# Patient Record
Sex: MALE | Race: WHITE | NOT HISPANIC OR LATINO | Employment: FULL TIME | URBAN - NONMETROPOLITAN AREA
[De-identification: names, ages, dates, MRNs, and addresses within clinical notes are randomized per-mention and may not be internally consistent; named-entity substitution may affect disease eponyms.]

---

## 2018-03-01 ENCOUNTER — OFFICE VISIT (OUTPATIENT)
Dept: INTERNAL MEDICINE | Facility: CLINIC | Age: 48
End: 2018-03-01

## 2018-03-01 VITALS
RESPIRATION RATE: 12 BRPM | WEIGHT: 207.4 LBS | HEART RATE: 61 BPM | SYSTOLIC BLOOD PRESSURE: 124 MMHG | HEIGHT: 72 IN | DIASTOLIC BLOOD PRESSURE: 80 MMHG | OXYGEN SATURATION: 96 % | BODY MASS INDEX: 28.09 KG/M2

## 2018-03-01 DIAGNOSIS — K21.9 GASTROESOPHAGEAL REFLUX DISEASE, ESOPHAGITIS PRESENCE NOT SPECIFIED: ICD-10-CM

## 2018-03-01 DIAGNOSIS — Z99.89 OBSTRUCTIVE SLEEP APNEA ON CPAP: Primary | ICD-10-CM

## 2018-03-01 DIAGNOSIS — M54.6 CHRONIC BILATERAL THORACIC BACK PAIN: ICD-10-CM

## 2018-03-01 DIAGNOSIS — Z80.0 FAMILY HISTORY OF COLON CANCER IN MOTHER: ICD-10-CM

## 2018-03-01 DIAGNOSIS — G89.29 CHRONIC BILATERAL THORACIC BACK PAIN: ICD-10-CM

## 2018-03-01 DIAGNOSIS — G47.33 OBSTRUCTIVE SLEEP APNEA ON CPAP: Primary | ICD-10-CM

## 2018-03-01 PROBLEM — K63.5 COLON POLYPS: Status: ACTIVE | Noted: 2018-03-01

## 2018-03-01 PROCEDURE — 99203 OFFICE O/P NEW LOW 30 MIN: CPT | Performed by: FAMILY MEDICINE

## 2018-03-01 RX ORDER — IBUPROFEN 800 MG/1
800 TABLET ORAL DAILY
COMMUNITY
End: 2019-06-20

## 2018-03-01 NOTE — PROGRESS NOTES
New pt, est care with Dr. Hernandez.   C/O pain in chest, back (between shoulder blades) ,ribs while sleeping. Wakes him up in the middle of the night. Has been going on about 6 months. Was seen by former PCp for this issue and was prescribed a muscle relaxer. Didn't really help.     Subjective   Nicho Molina is a 47 y.o. male who presents to establish care with me.    Chief Complaint:  Joint/Muscle Pain  Patient complains of myalgias, which have/has been present for 6 months. Pain is located in mid upper back & axilla along ribs.  Started about 6 months ago when he started sleeping on his back with his sleep machine.  The pain is described as moderate, Sharp/stabbing, in back but having to take ibuprofen nightly.  Associated symptoms include: none, but has a rare intermittent tingling pain from left neck down into shoulder.  Related to injury: no but has had lots of physical activity.    Gerd: admits to some gerd, but doesn't take anything for it.  Hasn't ever had an upper endoscopy.  Doesn't have obvious reflux symptoms during the day but does have some acid type tase in his mouth in the mornings.       The following portions of the patient's history were reviewed and updated as appropriate: He  has a past medical history of Arthritis; Colon polyp; Diverticulosis; Gout; and Hyperlipidemia.  He has Mixed hyperlipidemia and Obstructive sleep apnea on CPAP on his pertinent problem list.  He  has a past surgical history that includes Colonoscopy (2015) and Cholesteatoma excision (Right, 1981, 1984, 1989).  His family history includes Alcohol abuse in his brother and father; COPD in his father and maternal aunt; Cancer in his mother; Colon cancer in his mother; Early death in his mother; Heart attack (age of onset: 63) in his father; Heart disease in his father and maternal uncle; Hyperlipidemia in his father and maternal uncle; Hypertension in his father and maternal uncle; No Known Problems in his maternal aunt,  maternal aunt, and sister.  He  reports that he has never smoked. His smokeless tobacco use includes Snuff. He reports that he drinks about 4.8 oz of alcohol per week  He reports that he does not use illicit drugs.  Current Outpatient Prescriptions   Medication Sig Dispense Refill   • ibuprofen (ADVIL,MOTRIN) 800 MG tablet Take 800 mg by mouth Daily.       No current facility-administered medications for this visit.    .    Review of Systems  Review of Systems   Constitutional: Positive for fatigue and unexpected weight change. Negative for activity change, appetite change, chills and fever.        No malaise   HENT: Positive for hearing loss. Negative for ear pain, nosebleeds, rhinorrhea, sore throat, trouble swallowing and voice change.    Eyes: Negative for pain, discharge, redness, itching and visual disturbance.        No dry eyes   Respiratory: Negative for cough, chest tightness, shortness of breath and wheezing.         No SOB with exertion  No SOB lying down   Cardiovascular: Negative for chest pain, palpitations and leg swelling.        No leg cramps   Gastrointestinal: Negative for abdominal pain, blood in stool, constipation, diarrhea, nausea and vomiting.        + frequent heartburn  No change in bowel habits   Endocrine: Negative for cold intolerance, heat intolerance, polydipsia, polyphagia and polyuria.        No Muscle weakness  No feeling of weakness  No Hot flashes   Genitourinary: Negative for decreased urine volume, difficulty urinating, discharge, dysuria, frequency, hematuria, penile pain, testicular pain and urgency.        No lump on testicles   Musculoskeletal: Negative for arthralgias, gait problem, joint swelling and myalgias.        No limb pain  No limb swelling   Skin: Negative for rash and wound.        No rash  No lesions  No Itching  No change in mole   Neurological: Negative for dizziness, tremors, seizures, syncope, weakness, numbness and headaches.        No trouble walking  No  "confusion  No tingling       Hematological: Negative for adenopathy. Does not bruise/bleed easily.   Psychiatric/Behavioral: Positive for sleep disturbance. Negative for dysphoric mood and suicidal ideas. The patient is not nervous/anxious.         No change in personality         Objective    /80  Pulse 61  Resp 12  Ht 182.9 cm (72\")  Wt 94.1 kg (207 lb 6.4 oz)  SpO2 96%  BMI 28.13 kg/m2  Physical Exam   Constitutional: He is oriented to person, place, and time. He appears well-developed and well-nourished. No distress.   HENT:   Head: Normocephalic and atraumatic.   Right Ear: Tympanic membrane, external ear and ear canal normal.   Left Ear: Tympanic membrane, external ear and ear canal normal.   Nose: No rhinorrhea.   Mouth/Throat: Uvula is midline, oropharynx is clear and moist and mucous membranes are normal. No posterior oropharyngeal erythema. No tonsillar exudate.   Eyes: Conjunctivae and lids are normal. Pupils are equal, round, and reactive to light. Right eye exhibits no discharge. Left eye exhibits no discharge. No scleral icterus.   Neck: Trachea normal, normal range of motion and phonation normal. Neck supple. No thyroid mass and no thyromegaly present.   Cardiovascular: Normal rate, regular rhythm and normal heart sounds.    No murmur heard.  Pulmonary/Chest: Effort normal and breath sounds normal.   Abdominal: Soft. Normal appearance. There is no splenomegaly or hepatomegaly. There is no tenderness. No hernia.   Musculoskeletal: Normal range of motion. He exhibits no edema, tenderness or deformity.   Lymphadenopathy:        Head (right side): No submental, no submandibular, no preauricular and no posterior auricular adenopathy present.        Head (left side): No submental, no submandibular, no preauricular and no posterior auricular adenopathy present.     He has no cervical adenopathy.        Right: No supraclavicular adenopathy present.        Left: No supraclavicular adenopathy " present.   Neurological: He is alert and oriented to person, place, and time. He has normal strength. A sensory deficit is present.   Reflex Scores:       Patellar reflexes are 2+ on the right side and 2+ on the left side.  + spurlings left UE   Skin: Skin is warm and dry. No rash noted. No pallor.   Psychiatric: He has a normal mood and affect. His behavior is normal. Judgment and thought content normal.         Assessment/Plan      Diagnosis Plan   1. Obstructive sleep apnea on CPAP  Ferritin   2. Chronic bilateral thoracic back pain  Comprehensive Metabolic Panel    CBC (No Diff)    Ferritin    Iron Profile    Vitamin B12    XR Spine Thoracic 3 View   3. Gastroesophageal reflux disease, esophagitis presence not specified  Comprehensive Metabolic Panel    CBC (No Diff)    Ferritin    Iron Profile    Vitamin B12    Helicobacter Pylori, IgA IgG IgM   4. Family history of colon cancer in mother  Comprehensive Metabolic Panel    CBC (No Diff)    Ferritin    Iron Profile    Vitamin B12         depedning on xray and lab results will try antacid versus PT for pain.

## 2018-03-02 ENCOUNTER — HOSPITAL ENCOUNTER (OUTPATIENT)
Dept: GENERAL RADIOLOGY | Facility: HOSPITAL | Age: 48
Discharge: HOME | End: 2018-03-02
Attending: FAMILY MEDICINE | Admitting: FAMILY MEDICINE

## 2018-03-02 DIAGNOSIS — G89.29 CHRONIC BILATERAL THORACIC BACK PAIN: ICD-10-CM

## 2018-03-02 DIAGNOSIS — M54.6 CHRONIC BILATERAL THORACIC BACK PAIN: ICD-10-CM

## 2018-03-02 PROCEDURE — 72072 X-RAY EXAM THORAC SPINE 3VWS: CPT

## 2019-06-20 ENCOUNTER — OFFICE VISIT (OUTPATIENT)
Dept: INTERNAL MEDICINE | Facility: CLINIC | Age: 49
End: 2019-06-20

## 2019-06-20 VITALS
DIASTOLIC BLOOD PRESSURE: 82 MMHG | HEART RATE: 72 BPM | WEIGHT: 208 LBS | TEMPERATURE: 98.5 F | HEIGHT: 72 IN | SYSTOLIC BLOOD PRESSURE: 124 MMHG | BODY MASS INDEX: 28.17 KG/M2 | OXYGEN SATURATION: 98 %

## 2019-06-20 DIAGNOSIS — Z72.0 SMOKELESS TOBACCO USE: ICD-10-CM

## 2019-06-20 DIAGNOSIS — Z80.0 FAMILY HISTORY OF COLON CANCER IN MOTHER: ICD-10-CM

## 2019-06-20 DIAGNOSIS — Z12.11 SCREEN FOR COLON CANCER: ICD-10-CM

## 2019-06-20 DIAGNOSIS — Z12.5 SCREENING PSA (PROSTATE SPECIFIC ANTIGEN): ICD-10-CM

## 2019-06-20 DIAGNOSIS — Z71.6 ENCOUNTER FOR SMOKING CESSATION COUNSELING: ICD-10-CM

## 2019-06-20 DIAGNOSIS — K63.5 POLYP OF COLON, UNSPECIFIED PART OF COLON, UNSPECIFIED TYPE: ICD-10-CM

## 2019-06-20 DIAGNOSIS — E78.2 MIXED HYPERLIPIDEMIA: ICD-10-CM

## 2019-06-20 DIAGNOSIS — Z00.00 PREVENTATIVE HEALTH CARE: ICD-10-CM

## 2019-06-20 DIAGNOSIS — Z00.00 ANNUAL PHYSICAL EXAM: Primary | ICD-10-CM

## 2019-06-20 LAB
ALBUMIN SERPL-MCNC: 4.4 G/DL (ref 3.5–5)
ALBUMIN/GLOB SERPL: 1.6 G/DL (ref 1–2)
ALP SERPL-CCNC: 76 U/L (ref 38–126)
ALT SERPL-CCNC: 52 U/L (ref 13–69)
AST SERPL-CCNC: 34 U/L (ref 15–46)
BILIRUB SERPL-MCNC: 0.6 MG/DL (ref 0.2–1.3)
BUN SERPL-MCNC: 15 MG/DL (ref 7–20)
BUN/CREAT SERPL: 16.7 (ref 6.3–21.9)
CALCIUM SERPL-MCNC: 9.2 MG/DL (ref 8.4–10.2)
CHLORIDE SERPL-SCNC: 101 MMOL/L (ref 98–107)
CHOLEST SERPL-MCNC: 252 MG/DL (ref 0–199)
CO2 SERPL-SCNC: 29 MMOL/L (ref 26–30)
CREAT SERPL-MCNC: 0.9 MG/DL (ref 0.6–1.3)
GLOBULIN SER CALC-MCNC: 2.7 GM/DL
GLUCOSE SERPL-MCNC: 93 MG/DL (ref 74–98)
HDLC SERPL-MCNC: 41 MG/DL (ref 40–60)
LDLC SERPL CALC-MCNC: 149 MG/DL (ref 0–99)
POTASSIUM SERPL-SCNC: 4.2 MMOL/L (ref 3.5–5.1)
PROT SERPL-MCNC: 7.1 G/DL (ref 6.3–8.2)
PSA SERPL-MCNC: 0.73 NG/ML (ref 0.06–4)
SODIUM SERPL-SCNC: 140 MMOL/L (ref 137–145)
TRIGL SERPL-MCNC: 311 MG/DL
VLDLC SERPL CALC-MCNC: 62.2 MG/DL

## 2019-06-20 PROCEDURE — 99396 PREV VISIT EST AGE 40-64: CPT | Performed by: PHYSICIAN ASSISTANT

## 2019-06-20 RX ORDER — VARENICLINE TARTRATE 1 MG/1
1 TABLET, FILM COATED ORAL 2 TIMES DAILY
Qty: 60 TABLET | Refills: 4 | Status: SHIPPED | OUTPATIENT
Start: 2019-06-20 | End: 2020-01-14

## 2019-06-20 NOTE — PROGRESS NOTES
Subjective   Nicho Molina is a 48 y.o. male and is here for a comprehensive physical exam. The patient reports no problems.    HPI: History of mixed HLD.  At age 33 he was diagnosed with hyperlipidemia. He was prescribed statins but failed Crestor and Lipitor due to myalgias so he has not been treated since in the last 2 years or so. He has since changed his diet but has gained weight due to lack of exercise.     Last colonoscopy was around 3 years ago. Has had polyps with most of the 6 previous colonoscopies. Mom  from colon cancer at age 42.  He denies any GI complaints including constipation, diarrhea, abdominal pain, nausea, vomiting, acid reflux, melena or bright red blood per rectum.    Has been using smokeless tobacco for many years and is ready to quit. He has tried patches and gum which helped him quit for 6 and 9 months each time in the past.            Do you take any herbs or supplements that were not prescribed by a doctor? no  Are you taking calcium supplements? No  Are you taking aspirin daily? No     History:  Patient receives prostate care here: Yes  Date last prostate exam: 3-4 years ago  Date last PSA: 3-4 years ago  He reports little decrease in urinary stream,  No nocturia, little dribbling, little morning hesitancy.    Family history of prostate cancer: unknown.     has no sexual activity history on file.    The following portions of the patient's history were reviewed and updated as appropriate: He  has a past medical history of Arthritis, Colon polyp, Diverticulosis, Gout, and Hyperlipidemia.  He does not have any pertinent problems on file.  He  has a past surgical history that includes Colonoscopy () and Cholesteatoma excision (Right, , , ).  His family history includes Alcohol abuse in his brother and father; COPD in his father and maternal aunt; Cancer in his mother; Colon cancer in his mother; Early death in his mother; Heart attack (age of onset: 63) in his  father; Heart disease in his father and maternal uncle; Hyperlipidemia in his father and maternal uncle; Hypertension in his father and maternal uncle; No Known Problems in his maternal aunt, maternal aunt, and sister.  He  reports that he has never smoked. His smokeless tobacco use includes snuff. He reports that he drinks about 4.8 oz of alcohol per week. He reports that he does not use drugs.  Current Outpatient Medications   Medication Sig Dispense Refill   • varenicline (CHANTIX CONTINUING MONTH GABRIELLA) 1 MG tablet Take 1 tablet by mouth 2 (Two) Times a Day. 60 tablet 4   • varenicline (CHANTIX STARTING MONTH GABRIELLA) 0.5 MG X 11 & 1 MG X 42 tablet Take 0.5 mg one daily on days 1-3 and and 0.5 mg twice daily on days 4-7.Then 1 mg twice daily for a total of 12 weeks. 53 tablet 0     No current facility-administered medications for this visit.        Review of Systems  Do you have pain that bothers you in your daily life? no    Review of Systems   Constitutional: Negative for appetite change, chills, fatigue, fever and unexpected weight change.   HENT: Negative for congestion, ear pain, hearing loss, nosebleeds, postnasal drip, rhinorrhea, sore throat, tinnitus and trouble swallowing.    Eyes: Negative for photophobia, discharge and visual disturbance.   Respiratory: Negative for cough, chest tightness, shortness of breath and wheezing.    Cardiovascular: Negative for chest pain, palpitations and leg swelling.   Gastrointestinal: Negative for abdominal distention, abdominal pain, blood in stool, constipation, diarrhea, nausea and vomiting.   Endocrine: Negative for cold intolerance, heat intolerance, polydipsia, polyphagia and polyuria.   Genitourinary: Positive for decreased urine volume and difficulty urinating. Negative for discharge, dysuria, enuresis, flank pain, frequency, genital sores, hematuria, penile pain, penile swelling, scrotal swelling, testicular pain and urgency.   Musculoskeletal: Positive for  "arthralgias (occasional). Negative for back pain, joint swelling, myalgias, neck pain and neck stiffness.   Skin: Negative for color change, pallor, rash and wound.   Allergic/Immunologic: Negative for environmental allergies, food allergies and immunocompromised state.   Neurological: Negative for dizziness, tremors, seizures, weakness, numbness and headaches.   Hematological: Negative for adenopathy. Does not bruise/bleed easily.   Psychiatric/Behavioral: Negative for agitation, behavioral problems, confusion, hallucinations, self-injury and suicidal ideas. The patient is not nervous/anxious.          Objective   /82   Pulse 72   Temp 98.5 °F (36.9 °C)   Ht 182.9 cm (72.01\")   Wt 94.3 kg (208 lb)   SpO2 98%   BMI 28.20 kg/m²     Physical Exam   Constitutional: He is oriented to person, place, and time. He appears well-developed and well-nourished. No distress.   HENT:   Head: Normocephalic and atraumatic.   Right Ear: External ear normal.   Left Ear: External ear normal.   Nose: Nose normal.   Mouth/Throat: Oropharynx is clear and moist. No oropharyngeal exudate.   Eyes: Conjunctivae and EOM are normal. Pupils are equal, round, and reactive to light. No scleral icterus.   Neck: Normal range of motion. Neck supple. No thyromegaly present.   Cardiovascular: Normal rate, regular rhythm, normal heart sounds and intact distal pulses. Exam reveals no gallop and no friction rub.   No murmur heard.  Pulmonary/Chest: Effort normal and breath sounds normal. No stridor. No respiratory distress. He has no wheezes. He has no rales. He exhibits no tenderness.   Abdominal: Soft. Bowel sounds are normal. He exhibits no distension and no mass. There is no tenderness. There is no rebound and no guarding. No hernia.   Musculoskeletal: Normal range of motion. He exhibits no tenderness.   Lymphadenopathy:     He has no cervical adenopathy.   Neurological: He is alert and oriented to person, place, and time. He displays " normal reflexes. No cranial nerve deficit or sensory deficit.   Skin: Skin is warm and dry. Capillary refill takes less than 2 seconds. No rash noted. He is not diaphoretic. No pallor.   Psychiatric: He has a normal mood and affect. His behavior is normal. Judgment and thought content normal.   Nursing note and vitals reviewed.         Assessment/Plan   Healthy male exam.     1.    Diagnosis Plan   1. Annual physical exam     2. BMI 28.0-28.9,adult  Patient's Body mass index is 28.2 kg/m². BMI is above normal parameters. Recommendations include: exercise counseling and nutrition counseling.     3. Screen for colon cancer  Ambulatory Referral to Gastroenterology   4. Family history of colon cancer in mother  Ambulatory Referral to Gastroenterology   5. Polyp of colon, unspecified part of colon, unspecified type  Ambulatory Referral to Gastroenterology       6. Mixed hyperlipidemia  Lipid Panel     7. Preventative health care  Lipid Panel    Comprehensive Metabolic Panel    PSA Screen     8. Screening PSA (prostate specific antigen)  PSA Screen     9. Smokeless tobacco use  varenicline (CHANTIX STARTING MONTH GABRIELLA) 0.5 MG X 11 & 1 MG X 42 tablet    varenicline (CHANTIX CONTINUING MONTH GABRIELLA) 1 MG tablet     10. Encounter for smoking cessation counseling  varenicline (CHANTIX STARTING MONTH GABRIELLA) 0.5 MG X 11 & 1 MG X 42 tablet    varenicline (CHANTIX CONTINUING MONTH GABRIELLA) 1 MG tablet    Spent 5 minutes discussing nicotine cessation methods. I advised the patient of the risks in continuing to use tobacco.  I provided the patient with nicotine cessation educational materials as well as discussed methods to quit nicotine use and patient has epxressed the willingness to quit.        2. Patient Counseling:  --Nutrition: Stressed importance of moderation in sodium/caffeine intake, saturated fat and cholesterol, caloric balance, sufficient intake of fresh fruits, vegetables, fiber, calcium and iron.  --Discussed the issue of  calcium supplement, and the daily use of baby aspirin if applicable.  --Exercise: Stressed the importance of regular exercise.   --Substance Abuse: Discussed cessation/primary prevention of tobacco (if applicable, alcohol, or other drug use (if applicable); driving or other dangerous activities under the influence; availability of treatment for abuse.    --Sexuality: Discussed sexually transmitted diseases, partner selection, use of condoms, avoidance of unintended pregnancy  and contraceptive alternatives.   --Injury prevention: Discussed safety belts, safety helmets, smoke detector, smoking near bedding or upholstery.   --Dental health: Discussed importance of regular tooth brushing, flossing, and dental visits.  --Immunizations reviewed.  --Discussed benefits of screening colonoscopy (if applicable).  --After hours service discussed with patient.    3. Discussed the patient's BMI with him.  The BMI is above average; BMI management plan is completed  4. Return in about 1 year (around 6/20/2020) for Annual physical.       MIGUEL ANGEL Tarango  06/20/2019  9:33 AM

## 2019-06-24 ENCOUNTER — TELEPHONE (OUTPATIENT)
Dept: GASTROENTEROLOGY | Facility: CLINIC | Age: 49
End: 2019-06-24

## 2019-06-24 ENCOUNTER — TELEPHONE (OUTPATIENT)
Dept: INTERNAL MEDICINE | Facility: CLINIC | Age: 49
End: 2019-06-24

## 2019-06-24 DIAGNOSIS — Z12.11 SCREENING FOR COLON CANCER: Primary | ICD-10-CM

## 2019-06-24 RX ORDER — SODIUM, POTASSIUM,MAG SULFATES 17.5-3.13G
1 SOLUTION, RECONSTITUTED, ORAL ORAL TAKE AS DIRECTED
Qty: 2 BOTTLE | Refills: 0 | Status: SHIPPED | OUTPATIENT
Start: 2019-06-24 | End: 2020-01-14

## 2019-06-24 NOTE — TELEPHONE ENCOUNTER
CALLED PATIENT BACK. INFORMED PATIENT THAT I WILL FAX INSTRUCTIONS TO HIS PHARMACY. PATIENT VOICED UNDERSTANDING.

## 2019-06-25 ENCOUNTER — LAB REQUISITION (OUTPATIENT)
Dept: LAB | Facility: HOSPITAL | Age: 49
End: 2019-06-25

## 2019-06-25 ENCOUNTER — OUTSIDE FACILITY SERVICE (OUTPATIENT)
Dept: GASTROENTEROLOGY | Facility: CLINIC | Age: 49
End: 2019-06-25

## 2019-06-25 DIAGNOSIS — Z86.010 HISTORY OF COLONIC POLYPS: ICD-10-CM

## 2019-06-25 DIAGNOSIS — Z12.11 ENCOUNTER FOR SCREENING FOR MALIGNANT NEOPLASM OF COLON: ICD-10-CM

## 2019-06-25 DIAGNOSIS — Z80.0 FAMILY HISTORY OF MALIGNANT NEOPLASM OF DIGESTIVE ORGAN: ICD-10-CM

## 2019-06-25 PROCEDURE — 45385 COLONOSCOPY W/LESION REMOVAL: CPT | Performed by: INTERNAL MEDICINE

## 2019-06-25 PROCEDURE — 88305 TISSUE EXAM BY PATHOLOGIST: CPT | Performed by: INTERNAL MEDICINE

## 2019-06-26 LAB
CYTO UR: NORMAL
LAB AP CASE REPORT: NORMAL
LAB AP CLINICAL INFORMATION: NORMAL
PATH REPORT.FINAL DX SPEC: NORMAL
PATH REPORT.GROSS SPEC: NORMAL

## 2019-08-14 ENCOUNTER — PRIOR AUTHORIZATION (OUTPATIENT)
Dept: INTERNAL MEDICINE | Facility: CLINIC | Age: 49
End: 2019-08-14

## 2019-10-08 ENCOUNTER — CLINICAL SUPPORT (OUTPATIENT)
Dept: INTERNAL MEDICINE | Facility: CLINIC | Age: 49
End: 2019-10-08

## 2019-10-08 DIAGNOSIS — Z23 FLU VACCINE NEED: ICD-10-CM

## 2019-10-08 PROCEDURE — 90674 CCIIV4 VAC NO PRSV 0.5 ML IM: CPT | Performed by: INTERNAL MEDICINE

## 2019-10-08 PROCEDURE — 90471 IMMUNIZATION ADMIN: CPT | Performed by: INTERNAL MEDICINE

## 2020-01-06 ENCOUNTER — APPOINTMENT (OUTPATIENT)
Dept: GENERAL RADIOLOGY | Facility: HOSPITAL | Age: 50
End: 2020-01-06

## 2020-01-06 ENCOUNTER — TELEPHONE (OUTPATIENT)
Dept: INTERNAL MEDICINE | Facility: CLINIC | Age: 50
End: 2020-01-06

## 2020-01-06 ENCOUNTER — HOSPITAL ENCOUNTER (EMERGENCY)
Facility: HOSPITAL | Age: 50
Discharge: HOME | End: 2020-01-06
Attending: EMERGENCY MEDICINE | Admitting: EMERGENCY MEDICINE

## 2020-01-06 VITALS
DIASTOLIC BLOOD PRESSURE: 89 MMHG | HEART RATE: 77 BPM | WEIGHT: 210.8 LBS | OXYGEN SATURATION: 95 % | SYSTOLIC BLOOD PRESSURE: 133 MMHG | TEMPERATURE: 98.5 F | BODY MASS INDEX: 28.55 KG/M2 | RESPIRATION RATE: 18 BRPM | HEIGHT: 72 IN

## 2020-01-06 DIAGNOSIS — R74.01 TRANSAMINITIS: ICD-10-CM

## 2020-01-06 DIAGNOSIS — R07.9 CHEST PAIN, UNSPECIFIED TYPE: Primary | ICD-10-CM

## 2020-01-06 LAB
ALBUMIN SERPL-MCNC: 4.3 G/DL (ref 3.5–5.2)
ALBUMIN/GLOB SERPL: 1.7 G/DL
ALP SERPL-CCNC: 61 U/L (ref 39–117)
ALT SERPL W P-5'-P-CCNC: 55 U/L (ref 1–41)
ANION GAP SERPL CALCULATED.3IONS-SCNC: 14.3 MMOL/L (ref 5–15)
AST SERPL-CCNC: 60 U/L (ref 1–40)
BASOPHILS # BLD AUTO: 0.02 10*3/MM3 (ref 0–0.2)
BASOPHILS NFR BLD AUTO: 0.4 % (ref 0–1.5)
BILIRUB SERPL-MCNC: 0.5 MG/DL (ref 0.2–1.2)
BUN BLD-MCNC: 13 MG/DL (ref 6–20)
BUN/CREAT SERPL: 14.3 (ref 7–25)
CALCIUM SPEC-SCNC: 9.3 MG/DL (ref 8.6–10.5)
CHLORIDE SERPL-SCNC: 103 MMOL/L (ref 98–107)
CO2 SERPL-SCNC: 24.7 MMOL/L (ref 22–29)
CREAT BLD-MCNC: 0.91 MG/DL (ref 0.76–1.27)
DEPRECATED RDW RBC AUTO: 38.5 FL (ref 37–54)
EOSINOPHIL # BLD AUTO: 0.08 10*3/MM3 (ref 0–0.4)
EOSINOPHIL NFR BLD AUTO: 1.8 % (ref 0.3–6.2)
ERYTHROCYTE [DISTWIDTH] IN BLOOD BY AUTOMATED COUNT: 11.9 % (ref 12.3–15.4)
GFR SERPL CREATININE-BSD FRML MDRD: 88 ML/MIN/1.73
GLOBULIN UR ELPH-MCNC: 2.6 GM/DL
GLUCOSE BLD-MCNC: 91 MG/DL (ref 65–99)
HCT VFR BLD AUTO: 39 % (ref 37.5–51)
HGB BLD-MCNC: 13.1 G/DL (ref 13–17.7)
HOLD SPECIMEN: NORMAL
HOLD SPECIMEN: NORMAL
IMM GRANULOCYTES # BLD AUTO: 0.01 10*3/MM3 (ref 0–0.05)
IMM GRANULOCYTES NFR BLD AUTO: 0.2 % (ref 0–0.5)
LYMPHOCYTES # BLD AUTO: 0.99 10*3/MM3 (ref 0.7–3.1)
LYMPHOCYTES NFR BLD AUTO: 22.2 % (ref 19.6–45.3)
MCH RBC QN AUTO: 30.1 PG (ref 26.6–33)
MCHC RBC AUTO-ENTMCNC: 33.6 G/DL (ref 31.5–35.7)
MCV RBC AUTO: 89.7 FL (ref 79–97)
MONOCYTES # BLD AUTO: 0.54 10*3/MM3 (ref 0.1–0.9)
MONOCYTES NFR BLD AUTO: 12.1 % (ref 5–12)
NEUTROPHILS # BLD AUTO: 2.82 10*3/MM3 (ref 1.7–7)
NEUTROPHILS NFR BLD AUTO: 63.3 % (ref 42.7–76)
NRBC BLD AUTO-RTO: 0 /100 WBC (ref 0–0.2)
PLATELET # BLD AUTO: 150 10*3/MM3 (ref 140–450)
PMV BLD AUTO: 10.1 FL (ref 6–12)
POTASSIUM BLD-SCNC: 4.2 MMOL/L (ref 3.5–5.2)
PROT SERPL-MCNC: 6.9 G/DL (ref 6–8.5)
RBC # BLD AUTO: 4.35 10*6/MM3 (ref 4.14–5.8)
SODIUM BLD-SCNC: 142 MMOL/L (ref 136–145)
TROPONIN T SERPL-MCNC: <0.01 NG/ML (ref 0–0.03)
WBC NRBC COR # BLD: 4.46 10*3/MM3 (ref 3.4–10.8)
WHOLE BLOOD HOLD SPECIMEN: NORMAL
WHOLE BLOOD HOLD SPECIMEN: NORMAL

## 2020-01-06 PROCEDURE — 85025 COMPLETE CBC W/AUTO DIFF WBC: CPT | Performed by: EMERGENCY MEDICINE

## 2020-01-06 PROCEDURE — 84484 ASSAY OF TROPONIN QUANT: CPT | Performed by: EMERGENCY MEDICINE

## 2020-01-06 PROCEDURE — 93005 ELECTROCARDIOGRAM TRACING: CPT

## 2020-01-06 PROCEDURE — 99284 EMERGENCY DEPT VISIT MOD MDM: CPT

## 2020-01-06 PROCEDURE — 71045 X-RAY EXAM CHEST 1 VIEW: CPT

## 2020-01-06 PROCEDURE — 80053 COMPREHEN METABOLIC PANEL: CPT | Performed by: EMERGENCY MEDICINE

## 2020-01-06 RX ORDER — SODIUM CHLORIDE 0.9 % (FLUSH) 0.9 %
10 SYRINGE (ML) INJECTION AS NEEDED
Status: DISCONTINUED | OUTPATIENT
Start: 2020-01-06 | End: 2020-01-06 | Stop reason: HOSPADM

## 2020-01-06 NOTE — TELEPHONE ENCOUNTER
Patient was put on our schedule with sob, heartburn and right shoulder pain. I called patient and he advised he had gotten sob after one flight  He used to run marathons. I advised patient to go directly to ER. Patient understood

## 2020-01-11 NOTE — ED PROVIDER NOTES
Subjective  History of Present Illness:    Chief Complaint: Chest pain  History of Present Illness: 49-year-old male with a days of intermittent heartburn and indigestion, alcohol use, presents for concern regards to his persistent chest pain  Onset: Slightly more than a week  Duration: Intermittent  Exacerbating / Alleviating factors: None  Associated symptoms: None    Nurses Notes reviewed and agree, including vitals, allergies, social history and prior medical history.     REVIEW OF SYSTEMS: All systems reviewed and not pertinent unless noted.    Positive for: Heartburn indigestion chest pain    Negative for: Jaundice fever flank pain syncope hematuria hemoptysis    Past Medical History:   Diagnosis Date   • Arthritis    • Colon polyp    • Diverticulosis    • Gout    • Hyperlipidemia    • Sleep apnea        Allergies:  Patient has no known allergies.      Past Surgical History:   Procedure Laterality Date   • CHOLESTEATOMA EXCISION Right 1981, 1984, 1989    Cholesteatoma   • COLONOSCOPY  2015    every 2-3 years         Social History     Socioeconomic History   • Marital status:      Spouse name: Not on file   • Number of children: Not on file   • Years of education: Not on file   • Highest education level: Not on file   Tobacco Use   • Smoking status: Never Smoker   • Smokeless tobacco: Current User     Types: Snuff   Substance and Sexual Activity   • Alcohol use: Yes     Alcohol/week: 8.0 standard drinks     Types: 2 Glasses of wine, 6 Cans of beer per week   • Drug use: No         Family History   Problem Relation Age of Onset   • Cancer Mother         Colon   • Colon cancer Mother    • Early death Mother    • Heart attack Father 63   • Hyperlipidemia Father    • Hypertension Father    • Heart disease Father    • Alcohol abuse Father    • COPD Father    • No Known Problems Sister    • Alcohol abuse Brother    • COPD Maternal Aunt    • Hyperlipidemia Maternal Uncle    • Hypertension Maternal Uncle    •  Heart disease Maternal Uncle    • No Known Problems Maternal Aunt    • No Known Problems Maternal Aunt        Objective  Physical Exam:      GENERAL APPEARANCE: Well developed, well nourished, in no acute distress.  VITAL SIGNS: per nursing, reviewed and noted  SKIN: no rashes, ulcerations or petechiae.  Head: Normocephalic, atraumatic.   EYES: perrla. EOMI.  ENT:  TM clear, posterior pharynx patent.  LUNGS:  normal breath sounds. No retractions.   CARDIOVASCULAR:  regular rate and rhythm, no murmurs.  Good Peripheral pulses.  ABDOMEN: Soft, nontender, normal bowel sounds. No hernia. No ascites.  MUSCULOSKELETAL:  No tenderness. Full ROM. Strength and tone normal.  NEUROLOGIC: Alert, oriented x 3. No gross deficits.   NECK: Supple, symmetric. No tenderness, no masses. Full ROM  Back: full rom, no paraspinal spasm. No CVA tenderness.   PSYCH: appropriate affect  : no bladder tenderness or distention, no CVA tenderness        Procedures    ED Course:  ED Course as of Jan 11 0557   Mon Jan 06, 2020   1727 EKG interpreted by me reveals sinus rhythm rate 71.  No acute ischemic changes.  Normal EKG.    [PF]   1843 WBC: 4.46 [PF]   1843 Hemoglobin: 13.1 [PF]   1843 Hematocrit: 39.0 [PF]   1843 Platelets: 150 [PF]   1843 Glucose: 91 [PF]   1843 BUN: 13 [PF]   1843 Creatinine: 0.91 [PF]   1843 Sodium: 142 [PF]   1843 Potassium: 4.2 [PF]   1843 Chloride: 103 [PF]   1843 CO2: 24.7 [PF]   1843 Calcium: 9.3 [PF]   1843 Total Protein: 6.9 [PF]   1843 Albumin: 4.30 [PF]   1843 ALT (SGPT)(!): 55 [PF]   1843 AST (SGOT)(!): 60 [PF]   1843 Alkaline Phosphatase: 61 [PF]   1843 Total Bilirubin: 0.5 [PF]   1843 Troponin T: <0.010 [PF]   1843 Chest x-ray interpreted by me shows no evidence of any cardiomegaly, effusion, infiltrate, or bony abnormality.    [PF]      ED Course User Index  [PF] Carmelo Hawk W, DO       MDM  Slight elevation of AST and ALT most likely secondary to patient's alcohol use.  Reassuring work-up here regarding  ACS no evidence of acute coronary syndrome.  Discharged home stable condition with outpatient follow-up return precautions discussed.  Final diagnoses:   Chest pain, unspecified type   Transaminitis          Carmelo Hawk,   01/11/20 0557

## 2020-01-14 ENCOUNTER — OFFICE VISIT (OUTPATIENT)
Dept: INTERNAL MEDICINE | Facility: CLINIC | Age: 50
End: 2020-01-14

## 2020-01-14 VITALS
BODY MASS INDEX: 28.44 KG/M2 | SYSTOLIC BLOOD PRESSURE: 122 MMHG | WEIGHT: 210 LBS | DIASTOLIC BLOOD PRESSURE: 84 MMHG | OXYGEN SATURATION: 99 % | HEIGHT: 72 IN | TEMPERATURE: 98 F | HEART RATE: 72 BPM

## 2020-01-14 DIAGNOSIS — R06.09 DYSPNEA ON EXERTION: Primary | ICD-10-CM

## 2020-01-14 DIAGNOSIS — M25.511 RIGHT ANTERIOR SHOULDER PAIN: ICD-10-CM

## 2020-01-14 DIAGNOSIS — Z99.89 OBSTRUCTIVE SLEEP APNEA ON CPAP: ICD-10-CM

## 2020-01-14 DIAGNOSIS — G47.33 OBSTRUCTIVE SLEEP APNEA ON CPAP: ICD-10-CM

## 2020-01-14 DIAGNOSIS — R53.81 PHYSICAL DECONDITIONING: ICD-10-CM

## 2020-01-14 PROCEDURE — 99214 OFFICE O/P EST MOD 30 MIN: CPT | Performed by: PHYSICIAN ASSISTANT

## 2020-01-14 NOTE — PROGRESS NOTES
Nicho Molina is a 49 y.o. male.     Subjective   History of Present Illness   Patient with history significant for hyperlipidemia, obstructive sleep apnea and colon polyps is here today for follow up from ER visit on 1/6/20 where he presented with around 1 week of intermittent indigestion, chest pain, right shoulder pain and SOA.  During the ER visit he endorsed recent increased alcohol use.  Labs were notable for elevated ALT and AST likely secondary to alcohol use. EKG showed normal sinus rhythm.  Chest x-ray unremarkable. He resumed exercising around 1 month ago which may contribute to right shoulder pain.  He is now exercising around 4 days per week.  The right shoulder pain is predominantly bothersome in the pectoral muscle and axillary region.  Shoulder pain is more bothersome with deep breathing than with weight lifting.  He has been bothered by indigestion for the last couple of weeks which she describes as feeling bloated with increased gassiness particularly after dinner.  He has taken Tums twice since the ER visit which helped but symptoms were very mild to begin with.  He denies abdominal pain, nausea, vomiting, diarrhea, constipation, melena or BRBPR.  No alcohol use in the last week since the ER visit.  He was previously not using his CPAP but has resumed CPAP use this week.  He denies any SOA at rest but does feel as though he gets SOA with exertion which he strongly feels is related to poor exercise tolerance.  In the last 4 weeks since resuming exercising he feels SOA with exertion has already been improving. Patient denies orthopnea, palpitations, lower extremity edema, headaches, weakness, visual disturbances or confusion.       The following portions of the patient's history were reviewed and updated as appropriate: allergies, current medications, past family history, past medical history, past social history, past surgical history and problem list.    Review of Systems   Constitutional:  Negative for appetite change, chills, diaphoresis, fatigue, fever and unexpected weight change.   HENT: Negative.    Eyes: Negative for visual disturbance.   Respiratory: Positive for shortness of breath. Negative for cough, chest tightness, wheezing and stridor.    Cardiovascular: Positive for chest pain. Negative for palpitations and leg swelling.   Gastrointestinal: Negative for abdominal distention, abdominal pain, blood in stool, constipation, diarrhea, nausea and vomiting.        Digestion   Endocrine: Negative for cold intolerance, heat intolerance, polydipsia, polyphagia and polyuria.   Genitourinary: Negative.    Musculoskeletal: Positive for arthralgias and myalgias. Negative for back pain, gait problem, joint swelling, neck pain and neck stiffness.   Skin: Negative for color change and rash.   Allergic/Immunologic: Negative for immunocompromised state.   Neurological: Negative for dizziness, tremors, speech difficulty, weakness, numbness and headaches.   Hematological: Negative for adenopathy. Does not bruise/bleed easily.   Psychiatric/Behavioral: Negative for behavioral problems, confusion, dysphoric mood, self-injury and suicidal ideas. The patient is not nervous/anxious.          Objective    Physical Exam   Constitutional: He is oriented to person, place, and time. He appears well-developed and well-nourished. No distress.   HENT:   Head: Normocephalic and atraumatic.   Mouth/Throat: Oropharynx is clear and moist. No oropharyngeal exudate.   Eyes: Pupils are equal, round, and reactive to light. Conjunctivae and EOM are normal.   Neck: Normal range of motion. Neck supple. No thyromegaly present.   Cardiovascular: Normal rate, regular rhythm and normal heart sounds. Exam reveals no gallop and no friction rub.   No murmur heard.  Pulmonary/Chest: Effort normal and breath sounds normal. No stridor. No respiratory distress. He has no wheezes. He has no rales. He exhibits no tenderness.   Abdominal: Soft.  "Bowel sounds are normal. He exhibits no distension and no mass. There is no tenderness. There is no rebound. No hernia.   Musculoskeletal: Normal range of motion. He exhibits tenderness. He exhibits no edema or deformity.        Right shoulder: He exhibits tenderness. He exhibits normal range of motion, no bony tenderness, no swelling, no crepitus, no deformity, no pain, no spasm and normal strength.        Arms:  Lymphadenopathy:     He has no cervical adenopathy.   Neurological: He is alert and oriented to person, place, and time. He displays normal reflexes. No cranial nerve deficit or sensory deficit. He exhibits normal muscle tone. Coordination normal.   Skin: Skin is warm and dry. Capillary refill takes less than 2 seconds. No rash noted. He is not diaphoretic. No pallor.   Psychiatric: He has a normal mood and affect. His behavior is normal. Judgment and thought content normal.   Nursing note and vitals reviewed.        /84   Pulse 72   Temp 98 °F (36.7 °C)   Ht 182.9 cm (72.01\")   Wt 95.3 kg (210 lb)   SpO2 99%   BMI 28.47 kg/m²     Nursing note and vitals reviewed.          Assessment/Plan   Nicho was seen today for follow-up.    Diagnoses and all orders for this visit:    Dyspnea on exertion  Physical deconditioning  Gradually increase exercise as tolerated with a goal of at least 30 minutes 5 days/week.    Obstructive sleep apnea on CPAP  Strongly encouraged compliance with CPAP by wearing for at least 4 hours each night during sleep.    Right anterior shoulder pain  Appears to be musculoskeletal in nature.  Encouraged rest and heat as needed.  Gradually increase exercise as tolerated.      ER record reviewed.  He declined cardiology work-up at this time.    Call or return to clinic if symptoms worsen or persist.       "

## 2020-06-22 ENCOUNTER — OFFICE VISIT (OUTPATIENT)
Dept: INTERNAL MEDICINE | Facility: CLINIC | Age: 50
End: 2020-06-22

## 2020-06-22 VITALS
OXYGEN SATURATION: 98 % | SYSTOLIC BLOOD PRESSURE: 126 MMHG | HEART RATE: 61 BPM | HEIGHT: 72 IN | WEIGHT: 220 LBS | TEMPERATURE: 98.7 F | DIASTOLIC BLOOD PRESSURE: 86 MMHG | BODY MASS INDEX: 29.8 KG/M2

## 2020-06-22 DIAGNOSIS — Z12.5 SCREENING PSA (PROSTATE SPECIFIC ANTIGEN): ICD-10-CM

## 2020-06-22 DIAGNOSIS — Z00.00 ANNUAL PHYSICAL EXAM: Primary | ICD-10-CM

## 2020-06-22 DIAGNOSIS — E66.3 OVERWEIGHT WITH BODY MASS INDEX (BMI) OF 29 TO 29.9 IN ADULT: ICD-10-CM

## 2020-06-22 DIAGNOSIS — R21 RASH: ICD-10-CM

## 2020-06-22 DIAGNOSIS — H91.93 DECREASED HEARING OF BOTH EARS: ICD-10-CM

## 2020-06-22 DIAGNOSIS — E78.2 MIXED HYPERLIPIDEMIA: ICD-10-CM

## 2020-06-22 DIAGNOSIS — H54.7 DECREASED VISUAL ACUITY: ICD-10-CM

## 2020-06-22 DIAGNOSIS — Z23 NEED FOR TDAP VACCINATION: ICD-10-CM

## 2020-06-22 DIAGNOSIS — Z00.00 PREVENTATIVE HEALTH CARE: ICD-10-CM

## 2020-06-22 DIAGNOSIS — D22.9 ATYPICAL NEVI: ICD-10-CM

## 2020-06-22 PROCEDURE — 99396 PREV VISIT EST AGE 40-64: CPT | Performed by: PHYSICIAN ASSISTANT

## 2020-06-22 PROCEDURE — 90715 TDAP VACCINE 7 YRS/> IM: CPT | Performed by: PHYSICIAN ASSISTANT

## 2020-06-22 PROCEDURE — 90471 IMMUNIZATION ADMIN: CPT | Performed by: PHYSICIAN ASSISTANT

## 2020-06-22 NOTE — PROGRESS NOTES
Subjective   Nicho Molina is a 49 y.o. male and is here for a comprehensive physical exam. The patient reports problems - skin, hearing, weight gain.    HPI: In May he developed an itchy rash over his right scapula which was present for around 1 month before it began to resolve.  The rash was somewhat painful with applied pressure.  He did not seek any treatment for the rash.  He thought he may have come in contact with poison ivy or similar plant due to its itchy nature.  No history of shingles.  He has not received shingles vaccination. He also has many nevi which are not being followed by a dermatologist.     Visual acuity seems to be declining somewhat as he has become much more reliant on reading glasses.  It has been greater than 2 years since his last eye exam.  No eye injuries.    He believes his hearing ability has declined in both ears.  He has had 3 right ear surgeries to remove a cyst which resulted in decreased hearing in the right ear but now both ears seem to be decreased.         Health Habits:  Eye exam within last 2 years? No, overdue.  Dental exam every 6 months? Yes.  Exercise habits: started regimen around 1.5 months ago, exercising 2-3 days per week.   Healthy diet? Sometimes, needs improvement.    The 10-year ASCVD risk score ([place] DC Jr., et al., 2013) is: 6.2%    Values used to calculate the score:      Age: 49 years      Sex: Male      Is Non- : No      Diabetic: No      Tobacco smoker: No      Systolic Blood Pressure: 126 mmHg      Is BP treated: No      HDL Cholesterol: 41 mg/dL      Total Cholesterol: 252 mg/dL      Do you take any herbs or supplements that were not prescribed by a doctor? Irregular multivitamin use  Are you taking calcium supplements? No  Are you taking aspirin daily? No     History:  Patient receives prostate care here: Yes  Date last prostate exam: unknown  Date last PSA: 6/20/2019  He reports No decrease in urinary stream,  No  nocturia, Some dribbling, Some hesitancy.  No significant changes compared to 1 year ago.   Family history of prostate cancer: unknown. Does not known father or father's family history.      has no sexual activity history on file.      The following portions of the patient's history were reviewed and updated as appropriate: He  has a past medical history of Arthritis, Colon polyp, Diverticulosis, Gout, Hyperlipidemia, and Sleep apnea.  He does not have any pertinent problems on file.  He  has a past surgical history that includes Colonoscopy (2015) and Cholesteatoma excision (Right, 1981, 1984, 1989).  His family history includes Alcohol abuse in his brother and father; COPD in his father and maternal aunt; Cancer in his mother; Colon cancer in his mother; Early death in his mother; Heart attack (age of onset: 63) in his father; Heart disease in his father and maternal uncle; Hyperlipidemia in his father and maternal uncle; Hypertension in his father and maternal uncle; No Known Problems in his maternal aunt, maternal aunt, and sister.  He  reports that he has never smoked. His smokeless tobacco use includes snuff. He reports that he drinks about 8.0 standard drinks of alcohol per week. He reports that he does not use drugs.  No current outpatient medications on file.     No current facility-administered medications for this visit.        Review of Systems  Do you have pain that bothers you in your daily life? yes, heel spurs    Review of Systems   Constitutional: Negative for activity change, appetite change, chills, fatigue, fever and unexpected weight loss.   HENT: Positive for hearing loss. Negative for congestion, dental problem, ear pain, mouth sores, nosebleeds, postnasal drip, rhinorrhea, sinus pressure, sneezing, sore throat, swollen glands and voice change.    Eyes: Positive for visual disturbance. Negative for blurred vision, double vision, pain, discharge, redness and itching.   Respiratory: Negative for  "cough, choking, chest tightness, shortness of breath and wheezing.    Cardiovascular: Negative for chest pain, palpitations and leg swelling.   Gastrointestinal: Negative for abdominal distention, abdominal pain, blood in stool, constipation, diarrhea, nausea, vomiting and indigestion.   Endocrine: Negative for cold intolerance, heat intolerance, polydipsia, polyphagia and polyuria.   Genitourinary: Negative for decreased libido, decreased urine volume, difficulty urinating, discharge, dysuria, frequency, nocturia, erectile dysfunction, penile swelling, scrotal swelling, testicular pain and urgency.   Musculoskeletal: Positive for arthralgias. Negative for back pain, gait problem, joint swelling, myalgias and neck pain.   Skin: Negative for color change, rash and skin lesions.   Allergic/Immunologic: Negative for environmental allergies and immunocompromised state.   Neurological: Negative for dizziness, facial asymmetry, speech difficulty, weakness, numbness, headache, memory problem and confusion.   Hematological: Negative for adenopathy. Does not bruise/bleed easily.   Psychiatric/Behavioral: Negative for agitation, decreased concentration, hallucinations, self-injury, sleep disturbance, suicidal ideas, depressed mood and stress. The patient is not nervous/anxious.          Objective   /86   Pulse 61   Temp 98.7 °F (37.1 °C)   Ht 182.9 cm (72.01\")   Wt 99.8 kg (220 lb)   SpO2 98%   BMI 29.83 kg/m²     Physical Exam   Constitutional: He is oriented to person, place, and time. He appears well-developed and well-nourished. No distress.   overweight   HENT:   Head: Normocephalic and atraumatic.   Right Ear: Tympanic membrane is scarred. Decreased hearing is noted.   Left Ear: External ear normal. Tympanic membrane is not scarred. Decreased hearing is noted.   Eyes: Pupils are equal, round, and reactive to light. Conjunctivae and EOM are normal. No scleral icterus.   Neck: Normal range of motion. Neck " supple. No thyromegaly present.   Cardiovascular: Normal rate, regular rhythm, normal heart sounds and intact distal pulses. Exam reveals no gallop and no friction rub.   No murmur heard.  Pulmonary/Chest: Effort normal and breath sounds normal. No stridor. No respiratory distress. He has no wheezes. He has no rales. He exhibits no tenderness.   Abdominal: Soft. Bowel sounds are normal. He exhibits no distension and no mass. There is no tenderness. There is no rebound and no guarding. No hernia.   Musculoskeletal: Normal range of motion. He exhibits no tenderness.   Lymphadenopathy:     He has no cervical adenopathy.   Neurological: He is alert and oriented to person, place, and time. He displays normal reflexes. No cranial nerve deficit or sensory deficit.   Skin: Skin is warm and dry. Capillary refill takes less than 2 seconds. Lesion (many nevi) and rash noted. Rash is papular and maculopapular. He is not diaphoretic. There is erythema. No pallor.        Psychiatric: He has a normal mood and affect. His behavior is normal. Judgment and thought content normal.   Nursing note and vitals reviewed.         Assessment/Plan   Healthy male exam.     1.    Diagnosis Plan   1. Annual physical exam     2. Overweight with body mass index (BMI) of 29 to 29.9 in adult  Patient's Body mass index is 29.83 kg/m². BMI is above normal parameters. Recommendations include: exercise counseling and nutrition counseling.     3. Preventative health care  Lipid Panel    Comprehensive Metabolic Panel    PSA Screen     4. Mixed hyperlipidemia  Lipid Panel     5. Need for Tdap vaccination  Tdap Vaccine Greater Than or Equal To 6yo IM     6. Decreased visual acuity  Ambulatory Referral to Optometry     7. Screening PSA (prostate specific antigen)  PSA Screen     8. Decreased hearing of both ears  Ambulatory Referral to ENT (Otolaryngology)     9.  10. Atypical nevi   Rash (possible resolved shingles) Ambulatory Referral to Dermatology        2. Patient Counseling:  --Nutrition: Stressed importance of moderation in sodium/caffeine intake, saturated fat and cholesterol, caloric balance, sufficient intake of fresh fruits, vegetables, fiber, calcium and iron.  --Discussed the issue of calcium supplement, and the daily use of baby aspirin if applicable.  --Exercise: Stressed the importance of regular exercise.   --Substance Abuse: Discussed cessation/primary prevention of tobacco (if applicable, alcohol, or other drug use (if applicable); driving or other dangerous activities under the influence; availability of treatment for abuse.    --Sexuality: Discussed sexually transmitted diseases, partner selection, use of condoms, avoidance of unintended pregnancy  and contraceptive alternatives.   --Injury prevention: Discussed safety belts, safety helmets, smoke detector, smoking near bedding or upholstery.   --Dental health: Discussed importance of regular tooth brushing, flossing, and dental visits.  --Immunizations reviewed. TDaP today. Recommended he receive the shingles vaccine at his pharmacy.   --Discussed benefits of screening colonoscopy (if applicable).  --After hours service discussed with patient.    3. Discussed the patient's BMI with him.  The BMI is above average; BMI management plan is completed  4. Return in about 1 year (around 6/22/2021) for Annual physical.         MIGUEL ANGEL Tarango  06/22/2020  7:57 AM

## 2020-06-23 DIAGNOSIS — R73.01 ELEVATED FASTING GLUCOSE: Primary | ICD-10-CM

## 2020-06-23 LAB
ALBUMIN SERPL-MCNC: 4.7 G/DL (ref 3.5–5.2)
ALBUMIN/GLOB SERPL: 1.8 G/DL
ALP SERPL-CCNC: 85 U/L (ref 39–117)
ALT SERPL-CCNC: 37 U/L (ref 1–41)
AST SERPL-CCNC: 25 U/L (ref 1–40)
BILIRUB SERPL-MCNC: 0.3 MG/DL (ref 0.2–1.2)
BUN SERPL-MCNC: 16 MG/DL (ref 6–20)
BUN/CREAT SERPL: 16.2 (ref 7–25)
CALCIUM SERPL-MCNC: 9.6 MG/DL (ref 8.6–10.5)
CHLORIDE SERPL-SCNC: 103 MMOL/L (ref 98–107)
CHOLEST SERPL-MCNC: 270 MG/DL (ref 0–200)
CO2 SERPL-SCNC: 27.8 MMOL/L (ref 22–29)
CREAT SERPL-MCNC: 0.99 MG/DL (ref 0.76–1.27)
GLOBULIN SER CALC-MCNC: 2.6 GM/DL
GLUCOSE SERPL-MCNC: 110 MG/DL (ref 65–99)
HDLC SERPL-MCNC: 39 MG/DL (ref 40–60)
LDLC SERPL CALC-MCNC: 156 MG/DL (ref 0–100)
POTASSIUM SERPL-SCNC: 4.4 MMOL/L (ref 3.5–5.2)
PROT SERPL-MCNC: 7.3 G/DL (ref 6–8.5)
PSA SERPL-MCNC: 0.9 NG/ML (ref 0–4)
SODIUM SERPL-SCNC: 140 MMOL/L (ref 136–145)
TRIGL SERPL-MCNC: 374 MG/DL (ref 0–150)
VLDLC SERPL CALC-MCNC: 74.8 MG/DL

## 2020-06-25 LAB — HBA1C MFR BLD: 5.3 % (ref 4.8–5.6)

## 2020-07-29 ENCOUNTER — TELEPHONE (OUTPATIENT)
Dept: INTERNAL MEDICINE | Facility: CLINIC | Age: 50
End: 2020-07-29

## 2020-07-29 NOTE — TELEPHONE ENCOUNTER
PT CALLED STATING THAT HE WENT TO SEE HIS DAUGHTER AT [facility] AND FOUND OUT TODAY THAT SHE HAS COVID. PT DOESN'T HAVE ANY SYMPTOMS BUT WOULD LIKE TO KNOW IF HE SHOULD GET TESTED.    PT CONTACT [phone]     PLEASE ADVISE

## 2020-07-30 NOTE — TELEPHONE ENCOUNTER
I recommend he schedule himself to be tested at a local urgent care within the nextweek and he will need to quarantine for 14 days due to exposure.

## 2020-09-24 ENCOUNTER — RESULTS ENCOUNTER (OUTPATIENT)
Dept: INTERNAL MEDICINE | Facility: CLINIC | Age: 50
End: 2020-09-24

## 2020-09-24 ENCOUNTER — OFFICE VISIT (OUTPATIENT)
Dept: INTERNAL MEDICINE | Facility: CLINIC | Age: 50
End: 2020-09-24

## 2020-09-24 VITALS
TEMPERATURE: 98.2 F | HEIGHT: 72 IN | BODY MASS INDEX: 28.85 KG/M2 | WEIGHT: 213 LBS | DIASTOLIC BLOOD PRESSURE: 80 MMHG | HEART RATE: 78 BPM | OXYGEN SATURATION: 98 % | SYSTOLIC BLOOD PRESSURE: 120 MMHG

## 2020-09-24 DIAGNOSIS — K57.90 DIVERTICULOSIS: ICD-10-CM

## 2020-09-24 DIAGNOSIS — R39.9 URINARY SYMPTOM OR SIGN: ICD-10-CM

## 2020-09-24 DIAGNOSIS — R39.9 URINARY SYMPTOM OR SIGN: Primary | ICD-10-CM

## 2020-09-24 LAB
BILIRUB BLD-MCNC: NEGATIVE MG/DL
CLARITY, POC: CLEAR
COLOR UR: YELLOW
GLUCOSE UR STRIP-MCNC: NEGATIVE MG/DL
KETONES UR QL: NEGATIVE
LEUKOCYTE EST, POC: NEGATIVE
NITRITE UR-MCNC: NEGATIVE MG/ML
PH UR: 6.5 [PH] (ref 5–8)
PROT UR STRIP-MCNC: NEGATIVE MG/DL
RBC # UR STRIP: NEGATIVE /UL
SP GR UR: 1.01 (ref 1–1.03)
UROBILINOGEN UR QL: NORMAL

## 2020-09-24 PROCEDURE — 81003 URINALYSIS AUTO W/O SCOPE: CPT | Performed by: PHYSICIAN ASSISTANT

## 2020-09-24 PROCEDURE — 99213 OFFICE O/P EST LOW 20 MIN: CPT | Performed by: PHYSICIAN ASSISTANT

## 2020-09-24 NOTE — PROGRESS NOTES
Nicho Molina is a 49 y.o. male.     Subjective   History of Present Illness   Here today concerned with around 5 days of suprapubic tenderness and a different feeling during urination.  Symptoms initially began Saturday evening with acute onset lower abdominal pain with cramping, diarrhea, nausea and vomiting which have all since resolved with the exception of suprapubic tenderness.  He denies any pain with urination but notes that it just does not feel the same as it usually does.  When symptoms first began he had significant difficulty starting the urine stream which has improved daily since then and is nearly back to normal. Urine was also very yellow at that time due to dehydration and color has returned to pale yellow since then.  He had fever intermittently up to 101 or 102 which resolved as of 2 days ago.  No melena or BRBPR.  No urinary frequency, hematuria, urinary incontinence or flank pain.  He has had previous similar occurrences of GI symptoms but never accompanied by urinary symptoms.        The following portions of the patient's history were reviewed and updated as appropriate: allergies, current medications, past family history, past medical history, past social history, past surgical history and problem list.    Review of Systems   Constitutional: Positive for fatigue and fever. Negative for activity change, appetite change, chills, unexpected weight gain and unexpected weight loss.   HENT: Negative.    Eyes: Negative for visual disturbance.   Respiratory: Negative for cough, chest tightness, shortness of breath and wheezing.    Cardiovascular: Negative for chest pain and palpitations.   Gastrointestinal: Positive for abdominal pain, diarrhea (resolved), nausea (resolved) and vomiting (resolved). Negative for abdominal distention, anal bleeding, blood in stool, constipation, rectal pain, GERD and indigestion.   Endocrine: Negative for polydipsia, polyphagia and polyuria.   Genitourinary:  Positive for decreased urine volume, difficulty urinating, dysuria, frequency and urgency. Negative for discharge, flank pain, hematuria, nocturia, scrotal swelling, testicular pain and urinary incontinence.   Musculoskeletal: Negative.  Negative for arthralgias, back pain, gait problem and myalgias.   Skin: Negative.    Allergic/Immunologic: Negative for immunocompromised state.   Neurological: Negative for dizziness, syncope, weakness, numbness, headache and confusion.   Hematological: Negative for adenopathy. Does not bruise/bleed easily.   Psychiatric/Behavioral: Negative for agitation, sleep disturbance and depressed mood. The patient is not nervous/anxious.          Objective    Physical Exam  Vitals signs and nursing note reviewed.   Constitutional:       General: He is not in acute distress.     Appearance: Normal appearance. He is well-developed. He is not ill-appearing, toxic-appearing or diaphoretic.   HENT:      Head: Normocephalic and atraumatic.   Eyes:      General: No scleral icterus.     Conjunctiva/sclera: Conjunctivae normal.      Pupils: Pupils are equal, round, and reactive to light.   Neck:      Musculoskeletal: Normal range of motion and neck supple.   Cardiovascular:      Rate and Rhythm: Normal rate and regular rhythm.      Heart sounds: Normal heart sounds. No murmur. No friction rub. No gallop.    Pulmonary:      Effort: Pulmonary effort is normal. No respiratory distress.      Breath sounds: Normal breath sounds. No wheezing, rhonchi or rales.   Chest:      Chest wall: No tenderness.   Abdominal:      General: Abdomen is flat. Bowel sounds are normal. There is no distension.      Palpations: Abdomen is soft.      Tenderness: There is abdominal tenderness in the suprapubic area. There is no right CVA tenderness, left CVA tenderness, guarding or rebound. Negative signs include Sampson's sign, Rovsing's sign, McBurney's sign, psoas sign and obturator sign.      Hernia: No hernia is present.  "  Musculoskeletal: Normal range of motion.         General: No swelling, tenderness, deformity or signs of injury.      Right lower leg: No edema.      Left lower leg: No edema.   Skin:     General: Skin is warm and dry.      Capillary Refill: Capillary refill takes less than 2 seconds.      Coloration: Skin is not jaundiced or pale.      Findings: No bruising, erythema, lesion or rash.   Neurological:      General: No focal deficit present.      Mental Status: He is alert and oriented to person, place, and time.      Cranial Nerves: No cranial nerve deficit.      Sensory: No sensory deficit.      Motor: No abnormal muscle tone.      Coordination: Coordination normal.      Gait: Gait normal.      Deep Tendon Reflexes: Reflexes normal.   Psychiatric:         Mood and Affect: Mood normal.         Behavior: Behavior normal.         Thought Content: Thought content normal.         Judgment: Judgment normal.           /80   Pulse 78   Temp 98.2 °F (36.8 °C)   Ht 182.9 cm (72.01\")   Wt 96.6 kg (213 lb)   SpO2 98%   BMI 28.88 kg/m²     Nursing note and vitals reviewed.          Assessment/Plan   Nicho was seen today for urinary issue.    Diagnoses and all orders for this visit:    Urinary symptom or sign  -     POCT urinalysis dipstick, automated  -     Urine Culture - Urine, Urine, Clean Catch; Future    Brief Urine Lab Results  (Last result in the past 365 days)      Color   Clarity   Blood   Leuk Est   Nitrite   Protein   CREAT   Urine HCG        09/24/20 1131 Yellow Clear Negative Negative Negative Negative             Will await result of urine culture before considering treatment as symptoms are steadily improving without treatment.    Diverticulosis  History of diverticulosis with symptoms somewhat consistent with diverticulitis flareup.  Will begin treatment at this time as symptoms are improving.  Recommend clear liquid diet to be advanced as tolerated.      Differential diagnosis includes prostatitis, " UTI, diverticulitis or other etiology.       Call, return to clinic or proceed to the ER with any acutely worsened symptoms         MIGUEL ANGEL Tarango  09/24/2020  11:37 EDT

## 2021-02-15 RX ORDER — COLCHICINE 0.6 MG/1
TABLET ORAL
Qty: 3 TABLET | Refills: 0 | Status: SHIPPED | OUTPATIENT
Start: 2021-02-15 | End: 2021-03-08

## 2021-03-08 ENCOUNTER — OFFICE VISIT (OUTPATIENT)
Dept: INTERNAL MEDICINE | Facility: CLINIC | Age: 51
End: 2021-03-08

## 2021-03-08 VITALS
WEIGHT: 211 LBS | TEMPERATURE: 97.7 F | OXYGEN SATURATION: 99 % | DIASTOLIC BLOOD PRESSURE: 80 MMHG | HEIGHT: 72 IN | SYSTOLIC BLOOD PRESSURE: 130 MMHG | HEART RATE: 78 BPM | BODY MASS INDEX: 28.58 KG/M2

## 2021-03-08 DIAGNOSIS — K57.32 DIVERTICULITIS OF LARGE INTESTINE WITHOUT PERFORATION OR ABSCESS WITHOUT BLEEDING: Primary | ICD-10-CM

## 2021-03-08 PROCEDURE — 99213 OFFICE O/P EST LOW 20 MIN: CPT | Performed by: PHYSICIAN ASSISTANT

## 2021-03-08 RX ORDER — METRONIDAZOLE 500 MG/1
500 TABLET ORAL 3 TIMES DAILY
Qty: 21 TABLET | Refills: 0 | Status: SHIPPED | OUTPATIENT
Start: 2021-03-08 | End: 2021-03-15

## 2021-03-08 RX ORDER — DICYCLOMINE HYDROCHLORIDE 10 MG/1
10 CAPSULE ORAL
Qty: 28 CAPSULE | Refills: 8 | Status: SHIPPED | OUTPATIENT
Start: 2021-03-08 | End: 2021-03-15

## 2021-03-08 RX ORDER — CIPROFLOXACIN 500 MG/1
500 TABLET, FILM COATED ORAL EVERY 12 HOURS SCHEDULED
Qty: 14 TABLET | Refills: 0 | Status: SHIPPED | OUTPATIENT
Start: 2021-03-08 | End: 2021-03-15

## 2021-03-08 NOTE — PROGRESS NOTES
"Chief Complaint  GI Problem    Subjective          Nicho Molina presents to [facility]  History of Present Illness  Here today with concern of acute abdominal pain and cramping.  For the last 4 days he has had intermittent abdominal cramping and associated fever occurring within 1-2 hours of eating but it also occurs without eating. No diarrhea but rather has had trouble with constipation. He took a laxative 2 days ago and had 3 bowel movements yesterday which made him feel better for awhile. He is drinking plenty of fluids and had 1 Gatorade yesterday.  He has been nauseous intermittently along with feeling bloated but denies vomiting. He does have a history of diverticulitis with 2 minor flareups and now 2 major flareups within the last year which was previously not bothersome.  Colonoscopy from 6/25/2019 showed diverticulosis of large intestine.  He does have history of colon polyps as well as family history of colon cancer and was told to return for repeat colonoscopy in 3 years.           Objective   Vital Signs:   /80   Pulse 78   Temp 97.7 °F (36.5 °C)   Ht 182.9 cm (72.01\")   Wt 95.7 kg (211 lb)   SpO2 99%   BMI 28.61 kg/m²     Physical Exam  Vitals and nursing note reviewed.   Constitutional:       General: He is awake. He is not in acute distress.     Appearance: He is well-developed and overweight. He is not ill-appearing, toxic-appearing or diaphoretic.      Interventions: Face mask in place.   HENT:      Head: Normocephalic and atraumatic.      Right Ear: External ear normal.      Left Ear: External ear normal.   Eyes:      General: No scleral icterus.     Extraocular Movements: Extraocular movements intact.      Conjunctiva/sclera: Conjunctivae normal.      Pupils: Pupils are equal, round, and reactive to light.   Cardiovascular:      Rate and Rhythm: Normal rate and regular rhythm.      Heart sounds: Normal heart sounds. No murmur. No friction rub. No " gallop.    Pulmonary:      Effort: Pulmonary effort is normal. No respiratory distress.      Breath sounds: Normal breath sounds. No wheezing, rhonchi or rales.   Chest:      Chest wall: No tenderness.   Abdominal:      General: Bowel sounds are normal.      Palpations: Abdomen is soft.      Tenderness: There is abdominal tenderness in the periumbilical area, suprapubic area and left lower quadrant. There is guarding. There is no right CVA tenderness, left CVA tenderness or rebound. Negative signs include Sampson's sign, Rovsing's sign, McBurney's sign, psoas sign and obturator sign.      Hernia: No hernia is present.   Musculoskeletal:         General: No tenderness or deformity. Normal range of motion.      Cervical back: Normal range of motion and neck supple. No tenderness.      Right lower leg: No edema.      Left lower leg: No edema.   Lymphadenopathy:      Cervical: No cervical adenopathy.   Skin:     General: Skin is warm and dry.      Capillary Refill: Capillary refill takes less than 2 seconds.      Findings: No rash.   Neurological:      Mental Status: He is alert and oriented to person, place, and time.      Cranial Nerves: No cranial nerve deficit.      Sensory: No sensory deficit.      Motor: No abnormal muscle tone.      Coordination: Coordination normal.      Deep Tendon Reflexes: Reflexes normal.   Psychiatric:         Mood and Affect: Mood normal.         Behavior: Behavior normal. Behavior is cooperative.         Thought Content: Thought content normal.         Judgment: Judgment normal.        Result Review :   The following data was reviewed by: MIGUEL ANGEL Tarango on 03/08/2021:  Data reviewed: GI studies Colonoscopy from June 2019          Assessment and Plan    Diagnoses and all orders for this visit:    1. Diverticulitis of large intestine without perforation or abscess without bleeding (Primary)  -     ciprofloxacin (Cipro) 500 MG tablet; Take 1 tablet by mouth Every 12 (Twelve) Hours  for 7 days.  Dispense: 14 tablet; Refill: 0  -     metroNIDAZOLE (Flagyl) 500 MG tablet; Take 1 tablet by mouth 3 (Three) Times a Day for 7 days.  Dispense: 21 tablet; Refill: 0  -     dicyclomine (Bentyl) 10 MG capsule; Take 1 capsule by mouth 4 (Four) Times a Day Before Meals & at Bedtime As Needed (abdominal pain) for up to 7 days.  Dispense: 28 capsule; Refill: 8  Encouraged clear liquid diet to be advanced as tolerated.  Begin treatment with Cipro and Flagyl.  Use Bentyl 4 times daily as needed for abdominal pain.  Call, return to clinic or proceed to local ED with any acutely worsened symptoms.        I spent 27 minutes caring for Nicho on this date of service. This time includes time spent by me in the following activities:preparing for the visit, reviewing tests, obtaining and/or reviewing a separately obtained history, performing a medically appropriate examination and/or evaluation , counseling and educating the patient/family/caregiver, ordering medications, tests, or procedures and documenting information in the medical record  Follow Up   Return if symptoms worsen or fail to improve.  Patient was given instructions and counseling regarding his condition or for health maintenance advice. Please see specific information pulled into the AVS if appropriate.

## 2021-07-09 ENCOUNTER — OFFICE VISIT (OUTPATIENT)
Dept: INTERNAL MEDICINE | Facility: CLINIC | Age: 51
End: 2021-07-09

## 2021-07-09 VITALS
OXYGEN SATURATION: 99 % | HEART RATE: 73 BPM | BODY MASS INDEX: 28.71 KG/M2 | SYSTOLIC BLOOD PRESSURE: 122 MMHG | WEIGHT: 212 LBS | TEMPERATURE: 97.8 F | HEIGHT: 72 IN | DIASTOLIC BLOOD PRESSURE: 86 MMHG

## 2021-07-09 DIAGNOSIS — Z00.00 PREVENTATIVE HEALTH CARE: ICD-10-CM

## 2021-07-09 DIAGNOSIS — B35.3 TINEA PEDIS OF BOTH FEET: ICD-10-CM

## 2021-07-09 DIAGNOSIS — Z12.5 SCREENING PSA (PROSTATE SPECIFIC ANTIGEN): ICD-10-CM

## 2021-07-09 DIAGNOSIS — E78.2 MIXED HYPERLIPIDEMIA: ICD-10-CM

## 2021-07-09 DIAGNOSIS — Z00.00 ANNUAL PHYSICAL EXAM: Primary | ICD-10-CM

## 2021-07-09 DIAGNOSIS — E66.3 OVERWEIGHT WITH BODY MASS INDEX (BMI) OF 28 TO 28.9 IN ADULT: ICD-10-CM

## 2021-07-09 PROCEDURE — 99396 PREV VISIT EST AGE 40-64: CPT | Performed by: PHYSICIAN ASSISTANT

## 2021-07-09 RX ORDER — KETOCONAZOLE 20 MG/G
CREAM TOPICAL NIGHTLY
Qty: 60 G | Refills: 3 | Status: SHIPPED | OUTPATIENT
Start: 2021-07-09 | End: 2022-07-12

## 2021-07-09 RX ORDER — NYSTATIN 100000 [USP'U]/G
POWDER TOPICAL 2 TIMES DAILY PRN
Qty: 60 G | Refills: 3 | Status: SHIPPED | OUTPATIENT
Start: 2021-07-09 | End: 2022-07-12

## 2021-07-09 NOTE — PATIENT INSTRUCTIONS
Diverticulitis    Diverticulitis is infection or inflammation of small pouches (diverticula) in the colon that form due to a condition called diverticulosis. Diverticula can trap stool (feces) and bacteria, causing infection and inflammation.  Diverticulitis may cause severe stomach pain and diarrhea. It may lead to tissue damage in the colon that causes bleeding or blockage. The diverticula may also burst (rupture) and cause infected stool to enter other areas of the abdomen.  What are the causes?  This condition is caused by stool becoming trapped in the diverticula, which allows bacteria to grow in the diverticula. This leads to inflammation and infection.  What increases the risk?  You are more likely to develop this condition if you have diverticulosis. The risk increases if you:  · Are overweight or obese.  · Do not get enough exercise.  · Drink alcohol.  · Use tobacco products.  · Eat a diet that has a lot of red meat such as beef, pork, or lamb.  · Eat a diet that does not include enough fiber. High-fiber foods include fruits, vegetables, beans, nuts, and whole grains.  · Are over 40 years of age.  What are the signs or symptoms?  Symptoms of this condition may include:  · Pain and tenderness in the abdomen. The pain is normally located on the left side of the abdomen, but it may occur in other areas.  · Fever and chills.  · Nausea.  · Vomiting.  · Cramping.  · Bloating.  · Changes in bowel routines.  · Blood in your stool.  How is this diagnosed?  This condition is diagnosed based on:  · Your medical history.  · A physical exam.  · Tests to make sure there is nothing else causing your condition. These tests may include:  ? Blood tests.  ? Urine tests.  ? CT scan of the abdomen.  How is this treated?  Most cases of this condition are mild and can be treated at home. Treatment may include:  · Taking over-the-counter pain medicines.  · Following a clear liquid diet.  · Taking antibiotic medicines by  mouth.  · Resting.  More severe cases may need to be treated at a hospital. Treatment may include:  · Not eating or drinking.  · Taking prescription pain medicine.  · Receiving antibiotic medicines through an IV.  · Receiving fluids and nutrition through an IV.  · Surgery.  When your condition is under control, your health care provider may recommend that you have a colonoscopy. This is an exam to look at the entire large intestine. During the exam, a lubricated, bendable tube is inserted into the anus and then passed into the rectum, colon, and other parts of the large intestine. A colonoscopy can show how severe your diverticula are and whether something else may be causing your symptoms.  Follow these instructions at home:  Medicines  · Take over-the-counter and prescription medicines only as told by your health care provider. These include fiber supplements, probiotics, and stool softeners.  · If you were prescribed an antibiotic medicine, take it as told by your health care provider. Do not stop taking the antibiotic even if you start to feel better.  · Ask your health care provider if the medicine prescribed to you requires you to avoid driving or using machinery.  Eating and drinking    · Follow a full liquid diet or another diet as directed by your health care provider.  · After your symptoms improve, your health care provider may tell you to change your diet. He or she may recommend that you eat a diet that contains at least 25 grams (25 g) of fiber daily. Fiber makes it easier to pass stool. Healthy sources of fiber include:  ? Berries. One cup contains 4-8 grams of fiber.  ? Beans or lentils. One-half cup contains 5-8 grams of fiber.  ? Green vegetables. One cup contains 4 grams of fiber.  · Avoid eating red meat.  General instructions  · Do not use any products that contain nicotine or tobacco, such as cigarettes, e-cigarettes, and chewing tobacco. If you need help quitting, ask your health care  provider.  · Exercise for at least 30 minutes, 3 times each week. You should exercise hard enough to raise your heart rate and break a sweat.  · Keep all follow-up visits as told by your health care provider. This is important. You may need to have a colonoscopy.  Contact a health care provider if:  · Your pain does not improve.  · Your bowel movements do not return to normal.  Get help right away if:  · Your pain gets worse.  · Your symptoms do not get better with treatment.  · Your symptoms suddenly get worse.  · You have a fever.  · You vomit more than one time.  · You have stools that are bloody, black, or tarry.  Summary  · Diverticulitis is infection or inflammation of small pouches (diverticula) in the colon that form due to a condition called diverticulosis. Diverticula can trap stool (feces) and bacteria, causing infection and inflammation.  · You are at higher risk for this condition if you have diverticulosis and you eat a diet that does not include enough fiber.  · Most cases of this condition are mild and can be treated at home. More severe cases may need to be treated at a hospital.  · When your condition is under control, your health care provider may recommend that you have an exam called a colonoscopy. This exam can show how severe your diverticula are and whether something else may be causing your symptoms.  · Keep all follow-up visits as told by your health care provider. This is important.  This information is not intended to replace advice given to you by your health care provider. Make sure you discuss any questions you have with your health care provider.  Document Revised: 09/28/2020 Document Reviewed: 09/28/2020  [employer] Patient Education © 2021 [employer]

## 2021-07-09 NOTE — PROGRESS NOTES
Male Physical Note      Patient Name: Nicho Molina  : 1970   MRN: [id number]     Chief Complaint:    Chief Complaint   Patient presents with   • Annual Exam       History of Present Illness: Nicho Molina is a 50 y.o. male who is here today for their annual health maintenance and physical.     Rash on feet, cleared up last year with ketoconazole and nystatin.        Subjective         Past Medical History, Social History, Family History and Care Team were all reviewed with patient and updated as appropriate.       Current Outpatient Medications:   •  ketoconazole (NIZORAL) 2 % cream, Apply  topically to the appropriate area as directed Every Night., Disp: 60 g, Rfl: 3  •  nystatin (MYCOSTATIN) 987707 UNIT/GM powder, Apply  topically to the appropriate area as directed 2 (Two) Times a Day As Needed (rash)., Disp: 60 g, Rfl: 3    No Known Allergies      Diet/Physical activity: active with yard work 2-3 times per week    Sexual health:       Depression: PHQ-2 Depression Screening  Little interest or pleasure in doing things? 0   Feeling down, depressed, or hopeless? 0   PHQ-2 Total Score 0     The 10-year ASCVD risk score (Ashlynparis BURNS Jr., et al., 2013) is: 7.3%    Values used to calculate the score:      Age: 50 years      Sex: Male      Is Non- : No      Diabetic: No      Tobacco smoker: No      Systolic Blood Pressure: 122 mmHg      Is BP treated: No      HDL Cholesterol: 39 mg/dL      Total Cholesterol: 270 mg/dL    Health Maintenance:   Health Maintenance Summary          Ordered - LIPID PANEL (Yearly) Ordered on 2020  Lipid Panel    2019  Lipid Panel          Overdue - ANNUAL PHYSICAL (Yearly) Overdue since 2020  Registry Metric: Last Annual Physical          Postponed - ZOSTER VACCINE (2 of 2) Postponed until 2021  Postponed until 2022 by Alana Naylor MA (Product Unavailable)    2020   Imm Admin: Shingrix    06/22/2020  Postponed until 7/1/2021 by Alana Naylor MA (Product Unavailable)    06/20/2019  Postponed until 6/20/2020 by Alana Naylor MA (Pending event)          INFLUENZA VACCINE (Yearly - August to March) Next due on 8/1/2021    10/01/2020  Imm Admin: Influenza, Unspecified    09/24/2020  Postponed until 9/24/2021 by Alana Naylor MA (Patient Refused)    10/08/2019  Imm Admin: flucelvax quad pfs =>4 YRS    10/08/2019  Imm Admin: Influenza, Unspecified          COLORECTAL CANCER SCREENING (COLONOSCOPY - Every 3 Years) Next due on 6/25/2022 06/25/2019  SCANNED - COLONOSCOPY          TDAP/TD VACCINES (5 - Td or Tdap) Next due on 6/22/2030 06/22/2020  Imm Admin: Tdap    04/02/2004  Imm Admin: Td    03/05/1997  Imm Admin: Td    03/10/1990  Imm Admin: Td          COVID-19 Vaccine (Series Information) Completed    04/12/2021  Imm Admin: COVID-19 (MODERNA)    03/01/2021  Imm Admin: COVID-19 (MODERNA)          Pneumococcal Vaccine 0-64 (Series Information) Aged Out    No completion, postpone, or frequency change history exists for this topic.          Discontinued - HEPATITIS C SCREENING  Discontinued    06/22/2020  Frequency changed to Never by Leda Boykin PA (prior testing in )               PSA(Over age 50):   Lab Results   Component Value Date    PSA 0.902 06/22/2020    PSA 0.733 06/20/2019     US Aorta (For male smokers, age 65): N/a     Intimate partner violence: (Screen on initial visit, older adult with injury or evidence of neglect):  Violence can be a problem in many people's lives, so I now ask every patient about trauma or abuse they may have experienced in a relationship.  Stress/Safety - Do you feel safe in your relationship? Yes  Afraid/Abused - Have you ever been in a relationship where you were threatened, hurt, or afraid? No  Friend/Family - Are your friends aware you have been hurt? N/A  Emergency Plan - Do you have a safe place to go and  "the resources you need in an emergency? Yes    Osteoporosis screening:   Men: history of low trauma fracture, androgen deprivation therapy for prostate cancer, hypogonadism, primary hyperparathyroidism, intestinal disorders.       Objective     Physical Exam:  Vital Signs:   Vitals:    07/09/21 1246   BP: 122/86   Pulse: 73   Temp: 97.8 °F (36.6 °C)   SpO2: 99%   Weight: 96.2 kg (212 lb)   Height: 182.9 cm (72.01\")     Body mass index is 28.75 kg/m².     Physical Exam  Vitals and nursing note reviewed.   Constitutional:       General: He is awake. He is not in acute distress.     Appearance: Normal appearance. He is well-developed and overweight. He is not ill-appearing, toxic-appearing or diaphoretic.      Interventions: Face mask in place.   HENT:      Head: Normocephalic and atraumatic.      Right Ear: External ear normal.      Left Ear: External ear normal.      Mouth/Throat:      Mouth: Mucous membranes are moist.      Pharynx: No oropharyngeal exudate or posterior oropharyngeal erythema.   Eyes:      General: No scleral icterus.     Conjunctiva/sclera: Conjunctivae normal.      Pupils: Pupils are equal, round, and reactive to light.   Neck:      Thyroid: No thyromegaly.   Cardiovascular:      Rate and Rhythm: Normal rate and regular rhythm.      Heart sounds: Normal heart sounds. No murmur heard.   No friction rub. No gallop.    Pulmonary:      Effort: Pulmonary effort is normal. No respiratory distress.      Breath sounds: Normal breath sounds. No stridor. No wheezing or rales.   Chest:      Chest wall: No tenderness.   Abdominal:      General: Bowel sounds are normal. There is no distension.      Palpations: Abdomen is soft. There is no mass.      Tenderness: There is no abdominal tenderness. There is no right CVA tenderness, left CVA tenderness, guarding or rebound.      Hernia: No hernia is present.   Musculoskeletal:         General: No tenderness. Normal range of motion.      Cervical back: Normal range " of motion and neck supple. No rigidity.      Right lower leg: No edema.      Left lower leg: No edema.   Lymphadenopathy:      Cervical: No cervical adenopathy.   Skin:     General: Skin is warm and dry.      Capillary Refill: Capillary refill takes less than 2 seconds.      Coloration: Skin is not jaundiced or pale.      Findings: Rash (tinea pedis bilaterally) present.      Comments: Many nevi without concerning appearances.    Neurological:      Mental Status: He is alert and oriented to person, place, and time.      Cranial Nerves: No cranial nerve deficit.      Sensory: No sensory deficit.      Gait: Gait normal.      Deep Tendon Reflexes: Reflexes normal.   Psychiatric:         Mood and Affect: Mood normal.         Behavior: Behavior normal. Behavior is cooperative.         Thought Content: Thought content normal.         Judgment: Judgment normal.         Procedures    Assessment / Plan      Assessment/Plan:   Diagnoses and all orders for this visit:    1. Annual physical exam (Primary)    2. Overweight with body mass index (BMI) of 28 to 28.9 in adult  Patient's Body mass index is 28.75 kg/m². indicating that he is overweight (BMI 25-29.9). Obesity-related health conditions include the following: none. BMI is is above average; BMI management plan is completed. We discussed portion control and increasing exercise.    3. Mixed hyperlipidemia  -     Lipid Panel    4. Tinea pedis of both feet  -     ketoconazole (NIZORAL) 2 % cream; Apply  topically to the appropriate area as directed Every Night.  Dispense: 60 g; Refill: 3  -     nystatin (MYCOSTATIN) 961199 UNIT/GM powder; Apply  topically to the appropriate area as directed 2 (Two) Times a Day As Needed (rash).  Dispense: 60 g; Refill: 3    5. Preventative health care  -     Lipid Panel  -     Comprehensive Metabolic Panel  -     CBC (No Diff)    6. Screening PSA (prostate specific antigen)  -     PSA Screen         Follow Up:   Return in about 1 year (around  7/9/2022) for Annual physical.    Healthcare Maintenance:   Patient Counseling:  --Nutrition: Stressed importance of moderation in sodium/caffeine intake, saturated fat and cholesterol, caloric balance, sufficient intake of fresh fruits, vegetables, fiber, calcium and iron.  --Discussed the issue of calcium supplement, and the daily use of baby aspirin if applicable.  --Exercise: Stressed the importance of regular exercise.   --Substance Abuse: Discussed cessation/primary prevention of tobacco (if applicable, alcohol, or other drug use (if applicable); driving or other dangerous activities under the influence; availability of treatment for abuse.    --Sexuality: Discussed sexually transmitted diseases, partner selection, use of condoms, avoidance of unintended pregnancy  and contraceptive alternatives.   --Injury prevention: Discussed safety belts, safety helmets, smoke detector, smoking near bedding or upholstery.   --Dental health: Discussed importance of regular tooth brushing, flossing, and dental visits.  --Immunizations reviewed.  --Discussed benefits of screening colonoscopy (if applicable).  --After hours service discussed with patient.    Leda Boykin PA-C  Primary [facility] [address] [place]     Please note that portions of this note may have been completed with a voice recognition program. Efforts were made to edit the dictations, but occasionally words are mistranscribed.

## 2021-09-07 ENCOUNTER — OFFICE VISIT (OUTPATIENT)
Dept: INTERNAL MEDICINE | Facility: CLINIC | Age: 51
End: 2021-09-07

## 2021-09-07 ENCOUNTER — HOSPITAL ENCOUNTER (OUTPATIENT)
Dept: GENERAL RADIOLOGY | Facility: HOSPITAL | Age: 51
Discharge: HOME | End: 2021-09-07
Admitting: INTERNAL MEDICINE

## 2021-09-07 VITALS
OXYGEN SATURATION: 97 % | DIASTOLIC BLOOD PRESSURE: 90 MMHG | BODY MASS INDEX: 29.12 KG/M2 | HEART RATE: 60 BPM | HEIGHT: 72 IN | SYSTOLIC BLOOD PRESSURE: 140 MMHG | WEIGHT: 215 LBS | TEMPERATURE: 98 F

## 2021-09-07 DIAGNOSIS — M25.562 PAIN IN JOINT OF LEFT KNEE: ICD-10-CM

## 2021-09-07 DIAGNOSIS — I10 BENIGN ESSENTIAL HYPERTENSION: Primary | ICD-10-CM

## 2021-09-07 PROCEDURE — 73560 X-RAY EXAM OF KNEE 1 OR 2: CPT

## 2021-09-07 PROCEDURE — 99213 OFFICE O/P EST LOW 20 MIN: CPT | Performed by: INTERNAL MEDICINE

## 2021-09-07 PROCEDURE — 96372 THER/PROPH/DIAG INJ SC/IM: CPT | Performed by: INTERNAL MEDICINE

## 2021-09-07 RX ORDER — METHYLPREDNISOLONE SODIUM SUCCINATE 125 MG/2ML
125 INJECTION, POWDER, LYOPHILIZED, FOR SOLUTION INTRAMUSCULAR; INTRAVENOUS ONCE
Status: COMPLETED | OUTPATIENT
Start: 2021-09-07 | End: 2021-09-07

## 2021-09-07 RX ORDER — METHYLPREDNISOLONE 4 MG/1
TABLET ORAL
Qty: 21 TABLET | Refills: 0 | Status: SHIPPED | OUTPATIENT
Start: 2021-09-07 | End: 2022-07-12

## 2021-09-07 RX ADMIN — METHYLPREDNISOLONE SODIUM SUCCINATE 125 MG: 125 INJECTION, POWDER, LYOPHILIZED, FOR SOLUTION INTRAMUSCULAR; INTRAVENOUS at 14:51

## 2021-09-07 NOTE — PROGRESS NOTES
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      Objective   Vital Signs:   /90   Pulse 60   Temp 98 °F (36.7 °C) (Infrared)   Ht 182.9 cm (72.01\")   Wt 97.5 kg (215 lb)   SpO2 97%   BMI 29.15 kg/m²     Physical Exam  Vitals and nursing note reviewed.   Constitutional:       General: He is not in acute distress.     Appearance: Normal appearance. He is not diaphoretic.   HENT:      Head: Normocephalic and atraumatic.      Right Ear: External ear normal.      Left Ear: External ear normal.      Nose: Nose normal.   Eyes:      Extraocular Movements: Extraocular movements intact.      Conjunctiva/sclera: Conjunctivae normal.   Neck:      Trachea: Trachea normal.   Cardiovascular:      Rate and Rhythm: Normal rate and regular rhythm.      Heart sounds: Normal heart sounds.   Pulmonary:      Effort: Pulmonary effort is normal. No respiratory distress.   Abdominal:      General: Abdomen is flat.   Musculoskeletal:         General: Swelling, tenderness, deformity and signs of injury present.      Cervical back: Neck supple.      Left knee: Decreased range of motion.        Legs:       Comments: Moves all limbs   pain  On medial side on extension worse than flexion    Skin:     General: Skin is " warm and dry.      Findings: No erythema.   Neurological:      Mental Status: He is alert and oriented to person, place, and time.      Comments: No gross motor or sensory deficits        Result Review :                   Assessment and Plan    Diagnoses and all orders for this visit:    1. Benign essential hypertension (Primary)    2. Pain in joint of left knee  -     XR Knee 1 or 2 View Left  -     MRI Knee Left Without Contrast  -     Ambulatory Referral to Orthopedic Surgery  -     methylPREDNISolone sodium succinate (SOLU-Medrol) injection 125 mg  -     methylPREDNISolone (MEDROL) 4 MG dose pack; Take as directed on package instructions.  Dispense: 21 tablet; Refill: 0    Plan:  1.  Benign essential hypertension:  low-sodium diet advised, Counseled to regularly check BP at home with goal averaging <130/80.  Is been taking NSAIDs  2. Left knee pain : We will obtain x-ray, MRI , and Solu-Medrol given in office today, will start Medrol Dosepak and refer patient to orthopedist  I spent 20 minutes caring for Nicho on this date of service. This time includes time spent by me in the following activities:preparing for the visit, reviewing tests, performing a medically appropriate examination and/or evaluation , counseling and educating the patient/family/caregiver, ordering medications, tests, or procedures and documenting information in the medical record  Follow Up    Patient was given instructions and counseling regarding his condition or for health maintenance advice. Please see specific information pulled into the AVS if appropriate.

## 2021-09-09 ENCOUNTER — TELEPHONE (OUTPATIENT)
Dept: INTERNAL MEDICINE | Facility: CLINIC | Age: 51
End: 2021-09-09

## 2021-09-09 NOTE — TELEPHONE ENCOUNTER
Provider: LAILA MAGUIRE  Caller: NICOLAS LEIGH   Relationship to Patient: SELF  Phone Number: [phone]  Reason for Call: PATIENT NEEDS TO PICK  UP A COPY OF HIS DISC OF HIS X-RAY BEFORE HIS APPT 9/16/2021 @2:15PM OR CAN YOU SEND IT OVER TO DR ALAMO 'S OFFICE [phone] ([place]) IF IT HAS TO BE PICKED UP PLEASE GIVE PATIENT A CALL

## 2021-09-14 RX ORDER — COLCHICINE 0.6 MG/1
TABLET ORAL
Qty: 3 TABLET | Refills: 0 | Status: SHIPPED | OUTPATIENT
Start: 2021-09-14 | End: 2022-07-12

## 2021-10-05 ENCOUNTER — HOSPITAL ENCOUNTER (OUTPATIENT)
Dept: MRI IMAGING | Facility: HOSPITAL | Age: 51
Discharge: HOME | End: 2021-10-05
Admitting: INTERNAL MEDICINE

## 2021-10-05 PROCEDURE — 73721 MRI JNT OF LWR EXTRE W/O DYE: CPT

## 2021-10-07 ENCOUNTER — OFFICE VISIT (OUTPATIENT)
Dept: ORTHOPEDIC SURGERY | Facility: CLINIC | Age: 51
End: 2021-10-07

## 2021-10-07 VITALS — WEIGHT: 210 LBS | TEMPERATURE: 98.1 F | BODY MASS INDEX: 28.44 KG/M2 | HEIGHT: 72 IN

## 2021-10-07 DIAGNOSIS — M25.562 ARTHRALGIA OF LEFT KNEE: Primary | ICD-10-CM

## 2021-10-07 DIAGNOSIS — M17.10 ARTHRITIS OF KNEE: ICD-10-CM

## 2021-10-07 PROCEDURE — 99204 OFFICE O/P NEW MOD 45 MIN: CPT | Performed by: ORTHOPAEDIC SURGERY

## 2021-10-07 NOTE — PROGRESS NOTES
Subjective   Patient ID: Nicho Molina is a 50 y.o. male  Pain of the Left Knee (Left knee pain started about 3 weeks ago after changing a trailer tire. Woke up the next morning with knee pain and swelling, has slowly gotten better. 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  In the last several weeks his knee pain is improved he has no swelling full range of motion but Annika follow-up just to make sure no further treatment was necessary.    Review of Systems   Constitutional: Negative for fever.   HENT: Negative for dental problem and voice change.    Eyes: Negative for visual disturbance.   Respiratory: Negative for shortness of breath.    Cardiovascular: Negative for chest pain.   Gastrointestinal: Negative for abdominal pain.   Genitourinary: Negative for dysuria.   Musculoskeletal: Positive for arthralgias. Negative for gait problem and joint swelling.   Skin: Negative for rash.   Neurological: Negative for speech difficulty.   Hematological: Does not bruise/bleed easily.   Psychiatric/Behavioral: Negative for confusion.       Past Medical History:   Diagnosis Date   • Arthritis    • Colon polyp    • Diverticulosis    • Gout    • HL (hearing loss) 2017    getting worse   • Hyperlipidemia    • Sleep apnea         Past Surgical History:   Procedure Laterality Date   • CHOLESTEATOMA EXCISION Right 1981, 1984, 1989    Cholesteatoma   • COLONOSCOPY  2015    every 2-3 years       Family History   Problem Relation Age of Onset   • Cancer Mother         Colon   • Colon cancer Mother    • Early death Mother    • Heart attack Father 63   • Hyperlipidemia Father    • Hypertension  "Father    • Heart disease Father    • Alcohol abuse Father    • COPD Father    • No Known Problems Sister    • Alcohol abuse Brother    • COPD Maternal Aunt    • Hyperlipidemia Maternal Uncle    • Hypertension Maternal Uncle    • Heart disease Maternal Uncle    • No Known Problems Maternal Aunt    • No Known Problems Maternal Aunt        Social History     Socioeconomic History   • Marital status:      Spouse name: Not on file   • Number of children: Not on file   • Years of education: Not on file   • Highest education level: Not on file   Tobacco Use   • Smoking status: Never Smoker   • Smokeless tobacco: Current User     Types: Snuff   • Tobacco comment: Smokeless tobacco; [place] snuff   Substance and Sexual Activity   • Alcohol use: Yes     Alcohol/week: 8.0 standard drinks     Types: 2 Glasses of wine, 6 Cans of beer per week   • Drug use: No   • Sexual activity: Yes     Partners: Female     Birth control/protection: Surgical       I have reviewed the medical and surgical history, family history, social history, medications, and/or allergies, and the review of systems of this report.    No Known Allergies      Current Outpatient Medications:   •  colchicine 0.6 MG tablet, Take 2 tables by mouth now and then the last tablet 1 hour later., Disp: 3 tablet, Rfl: 0  •  ketoconazole (NIZORAL) 2 % cream, Apply  topically to the appropriate area as directed Every Night., Disp: 60 g, Rfl: 3  •  nystatin (MYCOSTATIN) 214573 UNIT/GM powder, Apply  topically to the appropriate area as directed 2 (Two) Times a Day As Needed (rash)., Disp: 60 g, Rfl: 3  •  methylPREDNISolone (MEDROL) 4 MG dose pack, Take as directed on package instructions., Disp: 21 tablet, Rfl: 0    Objective   Temp 98.1 °F (36.7 °C)   Ht 182.9 cm (72.01\")   Wt 95.3 kg (210 lb)   BMI 28.47 kg/m²    Physical Exam  Constitutional: Patient is oriented to person, place, and time. Patient appears well-developed and well-nourished.   HENT:Head: " Normocephalic and atraumatic.   Eyes: EOM are normal. Pupils are equal, round, and reactive to light.   Neck: Normal range of motion. Neck supple.   Cardiovascular: Normal rate.    Pulmonary/Chest: Effort normal and breath sounds normal.   Abdominal: Soft.   Neurological: Patient is alert and oriented to person, place, and time.   Skin: Skin is warm and dry.   Psychiatric: Patient has a normal mood and affect.   Nursing note and vitals reviewed.       [unfilled]   Left knee with no effusion full range of motion normal alignment Miguel Angel sign negative ligament exam unremarkable calf supple neurovascularly intact no patellofemoral tenderness crepitus or extensor loss noted.    Assessment/Plan   Review of Radiographic Studies:    Indication to evaluate joint condition, no comparison views available, shows evident chronic advanced osteoarthritis.  MR study directly examined confirms cartilage loss medial femoral condyle no discrete degenerative meniscal free fragments loose bodies or other subchondral pathology at the medial compartment, he does have some subchondral changes at his patella      Procedures     Diagnoses and all orders for this visit:    1. Arthralgia of left knee (Primary)  -     XR Knee 1 or 2 View Left; Future    2. Arthritis of knee       Orthopedic activities reviewed and patient expressed appreciation, Risk, benefits, and merits of treatment alternatives reviewed with the patient and questions answered and Using illustrations and models, the nature of the pathology was explained to the patient      Recommendations/Plan:   Work/Activity Status: May perform usual activities as tolerated    Patient agreeable to call or return sooner for any concerns.         I reviewed the patient's radiographs indicating advanced osteoarthritis discussed the natural history treatment options and pros and cons and risks and complications of surgical and nonsurgical care.  I also reviewed advantages and disadvantages  risks and complications of steroid injections versus viscus gel injections.  The patient had opportunity to ask questions which were answered.    Impression:  Mild medial compartment osteoarthritis left knee currently with normal exam  Plan:  Local modalities modification of activities avoidance of impact loading training activities encouragement of aquasize and return to see me as needed

## 2021-10-14 ENCOUNTER — PATIENT ROUNDING (BHMG ONLY) (OUTPATIENT)
Dept: ORTHOPEDIC SURGERY | Facility: CLINIC | Age: 51
End: 2021-10-14

## 2021-10-14 NOTE — PROGRESS NOTES
"October 14, 2021    Hello, may I speak with Nicho Molina?    My name is Meme Lockwood    I am  with [facility]  [address] [place] [id number] [phone].    Before we get started may I verify your date of birth? 1970    I am calling to officially welcome you to our practice and ask about your recent visit. Is this a good time to talk? yes    Tell me about your visit with us. What things went well?  \"Liked Dr. Stiles, No delays\"       We're always looking for ways to make our patients' experiences even better. Do you have recommendations on ways we may improve?  no    Overall were you satisfied with your first visit to our practice? yes       I appreciate you taking the time to speak with me today. Is there anything else I can do for you? no      Thank you, and have a great day.      "

## 2022-07-12 ENCOUNTER — OFFICE VISIT (OUTPATIENT)
Dept: INTERNAL MEDICINE | Facility: CLINIC | Age: 52
End: 2022-07-12

## 2022-07-12 VITALS
OXYGEN SATURATION: 95 % | TEMPERATURE: 97.8 F | HEART RATE: 86 BPM | DIASTOLIC BLOOD PRESSURE: 70 MMHG | BODY MASS INDEX: 29.28 KG/M2 | WEIGHT: 216.2 LBS | RESPIRATION RATE: 16 BRPM | SYSTOLIC BLOOD PRESSURE: 130 MMHG | HEIGHT: 72 IN

## 2022-07-12 DIAGNOSIS — E66.3 OVERWEIGHT WITH BODY MASS INDEX (BMI) OF 29 TO 29.9 IN ADULT: ICD-10-CM

## 2022-07-12 DIAGNOSIS — G47.33 OSA (OBSTRUCTIVE SLEEP APNEA): ICD-10-CM

## 2022-07-12 DIAGNOSIS — Z12.5 PROSTATE CANCER SCREENING: ICD-10-CM

## 2022-07-12 DIAGNOSIS — K57.90 DIVERTICULOSIS: ICD-10-CM

## 2022-07-12 DIAGNOSIS — R79.9 ABNORMAL BLOOD CHEMISTRY: ICD-10-CM

## 2022-07-12 DIAGNOSIS — E78.49 FAMILIAL HYPERLIPIDEMIA: ICD-10-CM

## 2022-07-12 DIAGNOSIS — R53.83 FATIGUE, UNSPECIFIED TYPE: ICD-10-CM

## 2022-07-12 DIAGNOSIS — Z00.00 ANNUAL PHYSICAL EXAM: Primary | ICD-10-CM

## 2022-07-12 DIAGNOSIS — Z12.11 COLON CANCER SCREENING: ICD-10-CM

## 2022-07-12 DIAGNOSIS — M10.9 ACUTE GOUT INVOLVING TOE, UNSPECIFIED CAUSE, UNSPECIFIED LATERALITY: ICD-10-CM

## 2022-07-12 DIAGNOSIS — Z72.0 TOBACCO USE: ICD-10-CM

## 2022-07-12 PROCEDURE — 99396 PREV VISIT EST AGE 40-64: CPT | Performed by: FAMILY MEDICINE

## 2022-07-12 NOTE — PROGRESS NOTES
07/12/2022  Chief Complaint   Patient presents with   • Annual Exam       Patient Care Team:  Heavenly Mcdonough MD as PCP - General (Family Medicine)]       Nicho Molina is here for his annual preventive exam. History per MA reviewed.     Familial hyperlipidemia: failed Lipitor, muscle aches. Thinks he was on Crestor, doesn't recall any side effects with it.    Nicho Molina has the following medical issues:  Patient Active Problem List    Diagnosis    • Familial hyperlipidemia [E78.49]    • Arthritis of knee [M17.10]    • Colon polyps [K63.5]    • Obstructive sleep apnea on CPAP [G47.33, Z99.89]    • Mixed hyperlipidemia [E78.2]    • Diverticulosis [K57.90]        Health Maintenance   Topic Date Due   • LIPID PANEL  06/22/2021   • COLORECTAL CANCER SCREENING  06/25/2022   • ANNUAL PHYSICAL  07/10/2022   • ZOSTER VACCINE (2 of 2) 07/18/2022 (Originally 9/7/2020)   • COVID-19 Vaccine (3 - Booster for Moderna series) 07/31/2022 (Originally 9/12/2021)   • INFLUENZA VACCINE  10/01/2022   • TDAP/TD VACCINES (5 - Td or Tdap) 06/22/2030   • Pneumococcal Vaccine 0-64  Aged Out   • HEPATITIS C SCREENING  Discontinued       Immunization History   Administered Date(s) Administered   • Anthrax 03/04/2008, 03/20/2008, 04/21/2008, 11/01/2008   • COVID-19 (MODERNA) 1st, 2nd, 3rd Dose Only 03/01/2021, 04/12/2021   • Hepatitis A 02/19/1998, 09/12/1998   • Hepatitis B 02/14/1998, 03/14/1998, 09/12/1998   • Influenza, Unspecified 10/08/2019, 10/01/2020   • MMR 08/05/2002   • OPV 09/09/1992, 04/02/2004   • PPD Test 06/23/2003, 04/02/2004, 03/04/2008   • Shingrix 07/13/2020   • Smallpox 03/04/2008   • Td 03/10/1990, 03/05/1997, 04/02/2004   • Tdap 06/22/2020   • Typhoid, Unspecified 03/10/1990, 02/14/1998, 04/02/2004, 03/04/2008   • Varicella Zoster Immune Globulin 04/02/2004   • Yellow Fever 03/14/1998   • flucelvax quad pfs =>4 YRS 10/08/2019       Review of Systems   Constitutional: Positive for fatigue. Negative for  "fever.   Gastrointestinal: Positive for abdominal pain (has had diverticulitis flares in past).   Musculoskeletal: Positive for arthralgias (at times gets redness, warmth to great toe joints feet).   Skin: Positive for skin lesions (blackhead, many moles).   Allergic/Immunologic: Positive for environmental allergies (problematic certain times of year).   All other systems reviewed and are negative.      The following portions of the patient's history were reviewed and updated as appropriate: allergies, current medications, past family history, past medical history, past social history, past surgical history and problem list.    Objective   Visit Vitals  /70 (BP Location: Left arm, Patient Position: Sitting, Cuff Size: Adult)   Pulse 86   Temp 97.8 °F (36.6 °C) (Temporal)   Resp 16   Ht 182.9 cm (72\")   Wt 98.1 kg (216 lb 3.2 oz)   SpO2 95%   BMI 29.32 kg/m²        Physical Exam  Vitals and nursing note reviewed.   Constitutional:       General: He is not in acute distress.     Appearance: Normal appearance. He is well-developed and well-groomed. obeseHe is not ill-appearing, toxic-appearing or diaphoretic.      Interventions: Face mask in place.   HENT:      Head: Normocephalic and atraumatic.      Right Ear: Hearing, tympanic membrane, ear canal and external ear normal.      Left Ear: Hearing, tympanic membrane, ear canal and external ear normal.   Eyes:      General: Lids are normal. Gaze aligned appropriately. No scleral icterus.        Right eye: No discharge.         Left eye: No discharge.      Extraocular Movements: Extraocular movements intact.      Conjunctiva/sclera: Conjunctivae normal.      Pupils: Pupils are equal, round, and reactive to light.   Neck:      Thyroid: No thyromegaly.      Trachea: Phonation normal.   Cardiovascular:      Rate and Rhythm: Normal rate and regular rhythm.      Heart sounds: Normal heart sounds.   Pulmonary:      Effort: Pulmonary effort is normal.      Breath sounds: " "Normal breath sounds and air entry.   Abdominal:      General: Bowel sounds are normal. There is no distension.      Palpations: Abdomen is soft. Abdomen is not rigid. There is no mass.      Tenderness: There is no abdominal tenderness. There is no guarding or rebound.   Musculoskeletal:         General: No deformity or signs of injury.      Cervical back: Neck supple.      Left foot: Bunion present.   Skin:     General: Skin is warm.      Capillary Refill: Capillary refill takes less than 2 seconds.      Coloration: Skin is not cyanotic, jaundiced or pale.      Findings: Lesion (many brown macules across back and chest w/o an \"ugly duckling\" lesion) present. No rash.          Neurological:      General: No focal deficit present.      Mental Status: He is alert and oriented to person, place, and time. Mental status is at baseline.      Cranial Nerves: No dysarthria.      Motor: No tremor, abnormal muscle tone or seizure activity.      Gait: Gait is intact.   Psychiatric:         Attention and Perception: Attention and perception normal.         Mood and Affect: Mood and affect normal.         Speech: Speech normal.         Behavior: Behavior normal. Behavior is cooperative.         Thought Content: Thought content normal.         Cognition and Memory: Cognition and memory normal.         Judgment: Judgment normal.         Lab Results   Component Value Date    CHLPL 270 (H) 06/22/2020    TRIG 374 (H) 06/22/2020    HDL 39 (L) 06/22/2020     (H) 06/22/2020     No results found for: TSH  No results found for: FREET4  Lab Results   Component Value Date    HGBA1C 5.30 06/24/2020       Assessment     Diagnoses and all orders for this visit:    1. Annual physical exam (Primary)    2. Familial hyperlipidemia  Comments:  discussed statins, lower risk of stroke, may restart crestor pending lab results and would have to repeat lipid panel and cmp in 6-8 weeks  Orders:  -     Comprehensive Metabolic Panel  -     Lipid " Panel    3. Fatigue, unspecified type  -     CBC & Differential  -     Comprehensive Metabolic Panel  -     TSH+Free T4  -     Uric Acid  -     Testosterone (Free & Total), LC / MS  -     Vitamin D 25 Hydroxy  -     Vitamin B12  -     Folate    4. Diverticulosis  Comments:  per last cscope with history of diverticulitis flares    5. DENNIS (obstructive sleep apnea)  Comments:  cpap use advised though patient aware of recall  Orders:  -     CBC & Differential  -     Comprehensive Metabolic Panel    6. Acute gout involving toe, unspecified cause, unspecified laterality  Comments:  not active at this time but has flared on occasion per patient history  Orders:  -     Comprehensive Metabolic Panel  -     Uric Acid    7. Tobacco use  Comments:  information shared on AVS  Orders:  -     CBC & Differential    8. Overweight with body mass index (BMI) of 29 to 29.9 in adult  -     Hemoglobin A1c  -     TSH+Free T4  -     Testosterone (Free & Total), LC / MS  -     Vitamin D 25 Hydroxy    9. Colon cancer screening  -     Ambulatory Referral to General Surgery    10. Abnormal blood chemistry  -     Hemoglobin A1c  -     CBC & Differential  -     Comprehensive Metabolic Panel  -     TSH+Free T4  -     Uric Acid  -     Testosterone (Free & Total), LC / MS  -     Vitamin D 25 Hydroxy  -     Vitamin B12  -     Folate  -     Lipid Panel    11. Prostate cancer screening  -     PSA Screen          · Health maintenance information provided with patient plan.   · Counseled on age appropriate health screenings. Encouraged staying up to date on eye and dental exams.  · Immunizations for age discussed, encouraged. Need Shingrix records from pharmacy, pt reports having both.  · Encouraged healthy habits such as exercise, healthy diet.  BMI is >= 25 and <30. (Overweight) The following options were offered after discussion;: weight loss educational material (shared in after visit summary)  ·   ·   · Return in about 1 year (around 7/12/2023) for  Annual physical.     Heavenly Mcdonough MD

## 2022-07-12 NOTE — PATIENT INSTRUCTIONS
Health Maintenance, Male  A healthy lifestyle and preventive care is important for your health and wellness. Ask your health care provider about what schedule of regular examinations is right for you.  What should I know about weight and diet?    Eat a Healthy Diet  · Eat plenty of vegetables, fruits, whole grains, low-fat dairy products, and lean protein.  · Do not eat a lot of foods high in solid fats, added sugars, or salt.     Maintain a Healthy Weight  Regular exercise can help you achieve or maintain a healthy weight. You should:  · Do at least 150 minutes of exercise each week. The exercise should increase your heart rate and make you sweat (moderate-intensity exercise).  · Do strength-training exercises at least twice a week.     Watch Your Levels of Cholesterol and Blood Lipids  · Have your blood tested for lipids and cholesterol every 5 years starting at 35 years of age. If you are at high risk for heart disease, you should start having your blood tested when you are 20 years old. You may need to have your cholesterol levels checked more often if:  ? Your lipid or cholesterol levels are high.  ? You are older than 50 years of age.  ? You are at high risk for heart disease.     What should I know about cancer screening?  Many types of cancers can be detected early and may often be prevented.  Lung Cancer  · You should be screened every year for lung cancer if:  ? You are a current smoker who has smoked for at least 30 years.  ? You are a former smoker who has quit within the past 15 years.  · Talk to your health care provider about your screening options, when you should start screening, and how often you should be screened.     Colorectal Cancer  · Routine colorectal cancer screening usually begins at 50 years of age and should be repeated every 5-10 years until you are 75 years old. You may need to be screened more often if early forms of precancerous polyps or small growths are found. Your health care  provider may recommend screening at an earlier age if you have risk factors for colon cancer.  · Your health care provider may recommend using home test kits to check for hidden blood in the stool.  · A small camera at the end of a tube can be used to examine your colon (sigmoidoscopy or colonoscopy). This checks for the earliest forms of colorectal cancer.     Prostate and Testicular Cancer  · Depending on your age and overall health, your health care provider may do certain tests to screen for prostate and testicular cancer.  · Talk to your health care provider about any symptoms or concerns you have about testicular or prostate cancer.     Skin Cancer  · Check your skin from head to toe regularly.  · Tell your health care provider about any new moles or changes in moles, especially if:  ? There is a change in a mole’s size, shape, or color.  ? You have a mole that is larger than a pencil eraser.  · Always use sunscreen. Apply sunscreen liberally and repeat throughout the day.  · Protect yourself by wearing long sleeves, pants, a wide-brimmed hat, and sunglasses when outside.     What should I know about heart disease, diabetes, and high blood pressure?  · If you are 18-39 years of age, have your blood pressure checked every 3-5 years. If you are 40 years of age or older, have your blood pressure checked every year. You should have your blood pressure measured twice--once when you are at a hospital or clinic, and once when you are not at a hospital or clinic. Record the average of the two measurements. To check your blood pressure when you are not at a hospital or clinic, you can use:  ? An automated blood pressure machine at a pharmacy.  ? A home blood pressure monitor.  · Talk to your health care provider about your target blood pressure.  · If you are between 45-79 years old, ask your health care provider if you should take aspirin to prevent heart disease.  · Have regular diabetes screenings by checking your  fasting blood sugar level.  ? If you are at a normal weight and have a low risk for diabetes, have this test once every three years after the age of 45.  ? If you are overweight and have a high risk for diabetes, consider being tested at a younger age or more often.  · A one-time screening for abdominal aortic aneurysm (AAA) by ultrasound is recommended for men aged 65-75 years who are current or former smokers.  What should I know about preventing infection?  Hepatitis B  If you have a higher risk for hepatitis B, you should be screened for this virus. Talk with your health care provider to find out if you are at risk for hepatitis B infection.  Hepatitis C  Blood testing is recommended for:  · Everyone born from 1945 through 1965.  · Anyone with known risk factors for hepatitis C.     Sexually Transmitted Diseases (STDs)  · You should be screened each year for STDs including gonorrhea and chlamydia if:  ? You are sexually active and are younger than 24 years of age.  ? You are older than 24 years of age and your health care provider tells you that you are at risk for this type of infection.  ? Your sexual activity has changed since you were last screened and you are at an increased risk for chlamydia or gonorrhea. Ask your health care provider if you are at risk.  · Talk with your health care provider about whether you are at high risk of being infected with HIV. Your health care provider may recommend a prescription medicine to help prevent HIV infection.     What else can I do?  ·   · Schedule regular health, dental, and eye exams.  · Stay current with your vaccines (immunizations).  · Do not use any tobacco products, such as cigarettes, chewing tobacco, and e-cigarettes. If you need help quitting, ask your health care provider.  · Limit alcohol intake to no more than 2 drinks per day. One drink equals 12 ounces of beer, 5 ounces of wine, or 1½ ounces of hard liquor.  · Do not use street drugs.  · Do not share  needles.  · Ask your health care provider for help if you need support or information about quitting drugs.  · Tell your health care provider if you often feel depressed.  · Tell your health care provider if you have ever been abused or do not feel safe at home.      This information is not intended to replace advice given to you by your health care provider. Make sure you discuss any questions you have with your health care provider.  Document Released: 06/15/2009 Document Revised: 08/16/2017 Document Reviewed: 09/20/2016  [employer] Patient Education © 2018 [employer]      IF YOU SMOKE OR USE TOBACCO PLEASE READ THE FOLLOWING:  Why is smoking bad for me?  Smoking increases the risk of heart disease, lung disease, vascular disease, stroke, and cancer. If you smoke, STOP!    For more information:  Quit Now [place]  1-800-QUIT-NOW  https://[place].quitlogix.org/en-[place]/

## 2022-07-13 ENCOUNTER — TELEPHONE (OUTPATIENT)
Dept: SURGERY | Facility: CLINIC | Age: 52
End: 2022-07-13

## 2022-07-15 DIAGNOSIS — E55.9 VITAMIN D INSUFFICIENCY: ICD-10-CM

## 2022-07-15 DIAGNOSIS — E78.49 FAMILIAL HYPERLIPIDEMIA: Primary | ICD-10-CM

## 2022-07-15 RX ORDER — ROSUVASTATIN CALCIUM 10 MG/1
10 TABLET, COATED ORAL NIGHTLY
Qty: 90 TABLET | Refills: 3 | Status: SHIPPED | OUTPATIENT
Start: 2022-07-15 | End: 2023-01-06 | Stop reason: SDDI

## 2022-07-15 NOTE — PROGRESS NOTES
one liver enzyme mildly elevated, similar to past level.  Okay to watch over time. Uric acid level elevated--if develops red/swollen/painful joints could be gout--notify provider. Limit intake of red meats, processed meats.     Vitamin D low normal. Suggest taking vitamin D3 over the counter 2000 IU daily and trying to get 15 min of sun exposure daily to face and arms (when possible).     Cholesterol elevated as we expected would be.  Recommend trial of Crestor low-dose.  Sending medicine, repeat fasting labs in 6 to 8 weeks.    Testosterone level is pending, free testosterone has come back and is in normal range.    Remainder of labs are normal.

## 2022-07-20 LAB
25(OH)D3+25(OH)D2 SERPL-MCNC: 31.6 NG/ML (ref 30–100)
ALBUMIN SERPL-MCNC: 4.5 G/DL (ref 3.5–5.2)
ALBUMIN/GLOB SERPL: 1.8 G/DL
ALP SERPL-CCNC: 92 U/L (ref 39–117)
ALT SERPL-CCNC: 59 U/L (ref 1–41)
AST SERPL-CCNC: 30 U/L (ref 1–40)
BASOPHILS # BLD AUTO: 0.04 10*3/MM3 (ref 0–0.2)
BASOPHILS NFR BLD AUTO: 0.9 % (ref 0–1.5)
BILIRUB SERPL-MCNC: 0.4 MG/DL (ref 0–1.2)
BUN SERPL-MCNC: 15 MG/DL (ref 6–20)
BUN/CREAT SERPL: 15.2 (ref 7–25)
CALCIUM SERPL-MCNC: 9.1 MG/DL (ref 8.6–10.5)
CHLORIDE SERPL-SCNC: 101 MMOL/L (ref 98–107)
CHOLEST SERPL-MCNC: 315 MG/DL (ref 0–200)
CO2 SERPL-SCNC: 25 MMOL/L (ref 22–29)
CREAT SERPL-MCNC: 0.99 MG/DL (ref 0.76–1.27)
EGFRCR SERPLBLD CKD-EPI 2021: 91.1 ML/MIN/1.73
EOSINOPHIL # BLD AUTO: 0.12 10*3/MM3 (ref 0–0.4)
EOSINOPHIL NFR BLD AUTO: 2.7 % (ref 0.3–6.2)
ERYTHROCYTE [DISTWIDTH] IN BLOOD BY AUTOMATED COUNT: 12.7 % (ref 12.3–15.4)
FOLATE SERPL-MCNC: 12.3 NG/ML (ref 4.78–24.2)
GLOBULIN SER CALC-MCNC: 2.5 GM/DL
GLUCOSE SERPL-MCNC: 99 MG/DL (ref 65–99)
HBA1C MFR BLD: 5.4 % (ref 4.8–5.6)
HCT VFR BLD AUTO: 42.6 % (ref 37.5–51)
HDLC SERPL-MCNC: 36 MG/DL (ref 40–60)
HGB BLD-MCNC: 14.6 G/DL (ref 13–17.7)
IMM GRANULOCYTES # BLD AUTO: 0.02 10*3/MM3 (ref 0–0.05)
IMM GRANULOCYTES NFR BLD AUTO: 0.4 % (ref 0–0.5)
LDLC SERPL CALC-MCNC: 174 MG/DL (ref 0–100)
LYMPHOCYTES # BLD AUTO: 1.63 10*3/MM3 (ref 0.7–3.1)
LYMPHOCYTES NFR BLD AUTO: 36.2 % (ref 19.6–45.3)
MCH RBC QN AUTO: 30.4 PG (ref 26.6–33)
MCHC RBC AUTO-ENTMCNC: 34.3 G/DL (ref 31.5–35.7)
MCV RBC AUTO: 88.6 FL (ref 79–97)
MONOCYTES # BLD AUTO: 0.56 10*3/MM3 (ref 0.1–0.9)
MONOCYTES NFR BLD AUTO: 12.4 % (ref 5–12)
NEUTROPHILS # BLD AUTO: 2.13 10*3/MM3 (ref 1.7–7)
NEUTROPHILS NFR BLD AUTO: 47.4 % (ref 42.7–76)
NRBC BLD AUTO-RTO: 0 /100 WBC (ref 0–0.2)
PLATELET # BLD AUTO: 184 10*3/MM3 (ref 140–450)
POTASSIUM SERPL-SCNC: 4.4 MMOL/L (ref 3.5–5.2)
PROT SERPL-MCNC: 7 G/DL (ref 6–8.5)
PSA SERPL-MCNC: 0.9 NG/ML (ref 0–4)
RBC # BLD AUTO: 4.81 10*6/MM3 (ref 4.14–5.8)
SODIUM SERPL-SCNC: 137 MMOL/L (ref 136–145)
T4 FREE SERPL-MCNC: 1.11 NG/DL (ref 0.93–1.7)
TESTOST FREE SERPL-MCNC: 16.3 PG/ML (ref 7.2–24)
TESTOST SERPL-MCNC: 284.4 NG/DL (ref 264–916)
TRIGL SERPL-MCNC: 520 MG/DL (ref 0–150)
TSH SERPL DL<=0.005 MIU/L-ACNC: 2.01 UIU/ML (ref 0.27–4.2)
URATE SERPL-MCNC: 8.4 MG/DL (ref 3.4–7)
VIT B12 SERPL-MCNC: 454 PG/ML (ref 211–946)
VLDLC SERPL CALC-MCNC: 105 MG/DL (ref 5–40)
WBC # BLD AUTO: 4.5 10*3/MM3 (ref 3.4–10.8)

## 2022-07-21 NOTE — PROGRESS NOTES
Testosterone level resulted later than other labs.  Free testosterone level is in normal range.  Total testosterone is in the low normal range.  If has concerns regarding energy levels, stamina, or other male hormone related issues can refer to urology.  Referral not absolutely indicated at this time.

## 2022-07-26 ENCOUNTER — PATIENT MESSAGE (OUTPATIENT)
Dept: INTERNAL MEDICINE | Facility: CLINIC | Age: 52
End: 2022-07-26

## 2022-07-26 DIAGNOSIS — R79.89 LOW TESTOSTERONE LEVEL IN MALE: ICD-10-CM

## 2022-07-26 DIAGNOSIS — R53.83 FATIGUE, UNSPECIFIED TYPE: Primary | ICD-10-CM

## 2022-07-26 DIAGNOSIS — H54.7 DECREASED VISUAL ACUITY: Primary | ICD-10-CM

## 2022-07-26 NOTE — TELEPHONE ENCOUNTER
From: Nicho Molina  To: Heavenly Mcdonough MD  Sent: 7/26/2022 8:22 AM EDT  Subject: Eye exam    Good morning. I also need a referral for a routine eye exam. It has been 2 years. The same doctor from last time if possible. Thank you.

## 2022-07-26 NOTE — TELEPHONE ENCOUNTER
From: Nicho Molina  To: Heavenly Mcdonough MD  Sent: 7/26/2022 7:44 AM EDT  Subject: Referal urology    I received your voice mail concerning a low/normal testosterone level and would like a referral for low energy.  Nicho

## 2022-08-09 NOTE — PROGRESS NOTES
Subjective   Nicho Molina is a 51 y.o. male.   Chief Complaint   Patient presents with   • Colonoscopy     Eval and treat         History of Present Illness   Mr. Molina is a 51-year-old gentleman who comes the office today to discuss colonoscopy.  It appears from his chart that we attempted to contact him and schedule him via the open access colonoscopy pathway, we were unsuccessful in reaching him.  Thus, he was scheduled for an office visit today.  The patient's last colonoscopy was performed by Dr. Shetty on 6/25/2019.  At that time, the indication was colon cancer screening in a patient with a personal history of colon polyps, and a family history of colon cancer.  A 4 mm sessile polyp was removed from the rectum.  Internal hemorrhoids were noted.  He was also noted to have diverticulosis of the left colon without perforation, abscess, or bleeding.  High-fiber diet was recommended, and repeat colonoscopy was recommended in 3 years.  Pathology demonstrated the polyp to be hyperplastic in nature.    He reports that he has had 7 or 8 previous colonoscopies.  He reports having a large adenomatous polyp removed during his first exam, which was followed up in 6 months.  He was then followed up 1 year later, and since then it has been about every 3 years.  He reports having polyps removed during each of his exams, but thinks that only on the first exam did he have any adenomas removed.  As detailed above, during his last exam, the polyp removed was hyperplastic.  He has no current complaints related to his bowel habits.  He does tell me that he has had 2 episodes of diverticulitis  by about a 6-week timeframe after his last colonoscopy.  This did not require hospitalization, and was managed on an outpatient basis.  He reports resolution with antibiotic therapy, and has had no recurrent issues.      The following portions of the patient's history were reviewed and updated as appropriate: allergies,  current medications, past family history, past medical history, past social history, past surgical history and problem list.      Patient Active Problem List   Diagnosis   • Mixed hyperlipidemia   • Diverticulosis   • Colon polyps   • Obstructive sleep apnea on CPAP   • Arthritis of knee   • Familial hyperlipidemia       Past Medical History:   Diagnosis Date   • Arthritis    • Colon polyp    • Diverticulosis    • Gout    • History of medical problems 2017    Gout, heel spurs   • HL (hearing loss) 2017    getting worse   • Hyperlipidemia    • Sleep apnea        Past Surgical History:   Procedure Laterality Date   • CHOLESTEATOMA EXCISION Right 1981, 1984, 1989    Cholesteatoma   • COLONOSCOPY  2015    every 2-3 years   • COLONOSCOPY W/ POLYPECTOMY  06/25/2019    hyperplastic rectal polyp, left sided diverticulosis, internal hemorrhoids - Dr. Kayode Shetty       Medications:     Current Outpatient Medications:   •  multivitamin with minerals tablet tablet, Take 1 tablet by mouth Daily., Disp: , Rfl:   •  rosuvastatin (Crestor) 10 MG tablet, Take 1 tablet by mouth Every Night. To lower cholesterol and risk of stroke., Disp: 90 tablet, Rfl: 3  •  bisacodyl (Dulcolax) 5 MG EC tablet, Take 2 tablets at 3 pm and 2 tablets at 7 pm the day prior to colonoscopy, Disp: 4 tablet, Rfl: 0  •  polyethylene glycol (MiraLax) 17 g packet, Mix 238 gram powder with 64 oz of clear liquid starting at 5 pm. Drink 8 oz every 10-15 minutes until consumed., Disp: 238 each, Rfl: 0    Allergies:   No Known Allergies      Family History   Problem Relation Age of Onset   • Colon cancer Mother 42   • Early death Mother    • Heart attack Father 63   • Hyperlipidemia Father    • Hypertension Father    • Heart disease Father    • Alcohol abuse Father    • COPD Father    • No Known Problems Sister    • Alcohol abuse Brother    • COPD Maternal Aunt    • No Known Problems Maternal Aunt    • No Known Problems Maternal Aunt    • Hyperlipidemia  "Maternal Uncle    • Hypertension Maternal Uncle    • Heart disease Maternal Uncle        Social History     Socioeconomic History   • Marital status:    Tobacco Use   • Smoking status: Never Smoker   • Smokeless tobacco: Current User     Types: Snuff   • Tobacco comment: Smokeless tobacco; [employer] snuff   Vaping Use   • Vaping Use: Never used   Substance and Sexual Activity   • Alcohol use: Yes     Alcohol/week: 8.0 standard drinks     Types: 2 Glasses of wine, 6 Cans of beer per week   • Drug use: No   • Sexual activity: Yes     Partners: Female     Birth control/protection: Surgical       Review of Systems   Constitutional: Negative for activity change, chills, fever and unexpected weight change.   HENT: Negative for hearing loss, trouble swallowing and voice change.    Eyes: Negative for visual disturbance.   Respiratory: Negative for apnea, cough, chest tightness, shortness of breath and wheezing.    Cardiovascular: Negative for chest pain, palpitations and leg swelling.   Gastrointestinal: Negative for abdominal distention, abdominal pain, anal bleeding, blood in stool, constipation, diarrhea, nausea, rectal pain and vomiting.   Endocrine: Negative for cold intolerance and heat intolerance.   Genitourinary: Negative for difficulty urinating, dysuria and flank pain.   Musculoskeletal: Negative for back pain and gait problem.   Skin: Negative for color change, rash and wound.   Neurological: Negative for dizziness, syncope, speech difficulty, weakness, light-headedness, numbness and headaches.   Hematological: Negative for adenopathy. Does not bruise/bleed easily.   Psychiatric/Behavioral: Negative for confusion. The patient is not nervous/anxious.      I have reviewed and confirmed the accuracy of the ROS as documented by the MA/LPN/RN Tatyana Tao MD        Objective    /82   Pulse 62   Temp 95.5 °F (35.3 °C) (Temporal)   Resp 16   Ht 182.9 cm (72\")   Wt 97.2 kg (214 lb 3.2 oz)   SpO2 " 98%   BMI 29.05 kg/m²     Physical Exam  Constitutional:       Appearance: He is well-developed.   HENT:      Head: Normocephalic and atraumatic.   Eyes:      General: No scleral icterus.     Pupils: Pupils are equal, round, and reactive to light.   Cardiovascular:      Rate and Rhythm: Regular rhythm.   Pulmonary:      Effort: Pulmonary effort is normal.   Abdominal:      General: There is no distension.      Palpations: Abdomen is soft.      Tenderness: There is no abdominal tenderness.   Skin:     General: Skin is warm and dry.   Neurological:      Mental Status: He is alert and oriented to person, place, and time.   Psychiatric:         Behavior: Behavior normal.         Assessment & Plan   Diagnoses and all orders for this visit:    1. Family history of colon cancer in mother (Primary)  -     Case Request; Standing  -     Case Request    2. History of adenomatous polyp of colon  -     Case Request; Standing  -     Case Request    3. Colon cancer screening  -     Case Request; Standing  -     Case Request    Other orders  -     bisacodyl (Dulcolax) 5 MG EC tablet; Take 2 tablets at 3 pm and 2 tablets at 7 pm the day prior to colonoscopy  Dispense: 4 tablet; Refill: 0  -     polyethylene glycol (MiraLax) 17 g packet; Mix 238 gram powder with 64 oz of clear liquid starting at 5 pm. Drink 8 oz every 10-15 minutes until consumed.  Dispense: 238 each; Refill: 0  -     Follow Anesthesia Guidelines / Standing Orders; Future  -     Obtain Informed Consent; Future  -     Provide NPO Instructions to Patient; Future  -     Chlorhexidine Skin Prep; Future        We discussed colonoscopy for colon cancer screening purposes.  We discussed the indications for screening colonoscopy as well as the risks, benefits and alternatives to this procedure. Risks including but not limited to perforation, bleeding,need for blood transfusion or emergent surgery ,and missed neoplasm were reviewed in detail with the patient. The necessary  bowel preparation and pre-procedure clear liquid diet was explained in detail.  A written instructional handout was also provided.  Electronic prescriptions for miralax and dulcolax were sent to the patient's pharmacy.  The patient was given an opportunity to ask questions.  The patient verbalized understanding of these recommendations and the plan of care. The patient is willing to proceed with colonoscopy and has signed informed consent in the office today.  My office will arrange scheduling for the colonoscopy procedure and pre-admission testing.

## 2022-08-10 ENCOUNTER — OFFICE VISIT (OUTPATIENT)
Dept: SURGERY | Facility: CLINIC | Age: 52
End: 2022-08-10

## 2022-08-10 VITALS
HEIGHT: 72 IN | BODY MASS INDEX: 29.01 KG/M2 | OXYGEN SATURATION: 98 % | SYSTOLIC BLOOD PRESSURE: 132 MMHG | WEIGHT: 214.2 LBS | RESPIRATION RATE: 16 BRPM | TEMPERATURE: 95.5 F | HEART RATE: 62 BPM | DIASTOLIC BLOOD PRESSURE: 82 MMHG

## 2022-08-10 DIAGNOSIS — Z86.010 HISTORY OF ADENOMATOUS POLYP OF COLON: ICD-10-CM

## 2022-08-10 DIAGNOSIS — Z80.0 FAMILY HISTORY OF COLON CANCER IN MOTHER: Primary | ICD-10-CM

## 2022-08-10 DIAGNOSIS — Z12.11 COLON CANCER SCREENING: ICD-10-CM

## 2022-08-10 PROCEDURE — S0260 H&P FOR SURGERY: HCPCS | Performed by: SURGERY

## 2022-08-10 RX ORDER — SODIUM CHLORIDE 0.9 % (FLUSH) 0.9 %
10 SYRINGE (ML) INJECTION AS NEEDED
Status: CANCELLED | OUTPATIENT
Start: 2022-08-10

## 2022-08-10 RX ORDER — POLYETHYLENE GLYCOL 3350 17 G/17G
POWDER, FOR SOLUTION ORAL
Qty: 238 EACH | Refills: 0 | Status: SHIPPED | OUTPATIENT
Start: 2022-08-10 | End: 2022-10-18 | Stop reason: HOSPADM

## 2022-08-10 RX ORDER — BISACODYL 5 MG
TABLET, DELAYED RELEASE (ENTERIC COATED) ORAL
Qty: 4 TABLET | Refills: 0 | Status: SHIPPED | OUTPATIENT
Start: 2022-08-10 | End: 2022-10-18 | Stop reason: HOSPADM

## 2022-08-10 RX ORDER — MULTIPLE VITAMINS W/ MINERALS TAB 9MG-400MCG
1 TAB ORAL DAILY
COMMUNITY

## 2022-08-10 RX ORDER — SODIUM CHLORIDE 0.9 % (FLUSH) 0.9 %
10 SYRINGE (ML) INJECTION EVERY 12 HOURS SCHEDULED
Status: CANCELLED | OUTPATIENT
Start: 2022-08-10

## 2022-08-10 RX ORDER — SODIUM CHLORIDE, SODIUM LACTATE, POTASSIUM CHLORIDE, CALCIUM CHLORIDE 600; 310; 30; 20 MG/100ML; MG/100ML; MG/100ML; MG/100ML
50 INJECTION, SOLUTION INTRAVENOUS CONTINUOUS
Status: CANCELLED | OUTPATIENT
Start: 2022-08-10

## 2022-08-30 ENCOUNTER — TELEPHONE (OUTPATIENT)
Dept: SURGERY | Facility: CLINIC | Age: 52
End: 2022-08-30

## 2022-08-30 PROBLEM — Z86.0101 HISTORY OF ADENOMATOUS POLYP OF COLON: Status: ACTIVE | Noted: 2022-08-30

## 2022-08-30 PROBLEM — Z12.11 COLON CANCER SCREENING: Status: ACTIVE | Noted: 2022-08-30

## 2022-08-30 PROBLEM — Z80.0 FAMILY HISTORY OF COLON CANCER IN MOTHER: Status: ACTIVE | Noted: 2022-08-30

## 2022-08-30 PROBLEM — Z86.010 HISTORY OF ADENOMATOUS POLYP OF COLON: Status: ACTIVE | Noted: 2022-08-30

## 2022-08-31 ENCOUNTER — TELEPHONE (OUTPATIENT)
Dept: SURGERY | Facility: CLINIC | Age: 52
End: 2022-08-31

## 2022-09-26 ENCOUNTER — TELEPHONE (OUTPATIENT)
Dept: SURGERY | Facility: CLINIC | Age: 52
End: 2022-09-26

## 2022-09-26 NOTE — TELEPHONE ENCOUNTER
SPOKE WITH NICOLAS AND HE NEEDS TO RESCHEDULE PROCEDURE FOR THIS FRIDAY. PLEASE CALL AFTER 3:00 PM TO RECEDURE. HE WILL BE OUT OF TOWN.

## 2022-10-06 ENCOUNTER — OFFICE VISIT (OUTPATIENT)
Dept: INTERNAL MEDICINE | Facility: CLINIC | Age: 52
End: 2022-10-06

## 2022-10-06 VITALS
DIASTOLIC BLOOD PRESSURE: 90 MMHG | HEART RATE: 72 BPM | WEIGHT: 215.2 LBS | SYSTOLIC BLOOD PRESSURE: 120 MMHG | TEMPERATURE: 98.3 F | OXYGEN SATURATION: 98 % | BODY MASS INDEX: 29.15 KG/M2 | HEIGHT: 72 IN

## 2022-10-06 DIAGNOSIS — R10.32 LEFT LOWER QUADRANT ABDOMINAL PAIN: ICD-10-CM

## 2022-10-06 DIAGNOSIS — K57.32 DIVERTICULITIS OF LARGE INTESTINE WITHOUT PERFORATION OR ABSCESS WITHOUT BLEEDING: Primary | ICD-10-CM

## 2022-10-06 LAB
BILIRUB BLD-MCNC: NEGATIVE MG/DL
CLARITY, POC: CLEAR
COLOR UR: YELLOW
EXPIRATION DATE: NORMAL
GLUCOSE UR STRIP-MCNC: NEGATIVE MG/DL
KETONES UR QL: NEGATIVE
LEUKOCYTE EST, POC: NEGATIVE
Lab: NORMAL
NITRITE UR-MCNC: NEGATIVE MG/ML
PH UR: 6 [PH] (ref 5–8)
PROT UR STRIP-MCNC: NEGATIVE MG/DL
RBC # UR STRIP: NEGATIVE /UL
SP GR UR: 1.01 (ref 1–1.03)
UROBILINOGEN UR QL: NORMAL

## 2022-10-06 PROCEDURE — 81003 URINALYSIS AUTO W/O SCOPE: CPT | Performed by: NURSE PRACTITIONER

## 2022-10-06 PROCEDURE — 99214 OFFICE O/P EST MOD 30 MIN: CPT | Performed by: NURSE PRACTITIONER

## 2022-10-06 RX ORDER — CIPROFLOXACIN 500 MG/1
500 TABLET, FILM COATED ORAL 2 TIMES DAILY
Qty: 14 TABLET | Refills: 0 | Status: SHIPPED | OUTPATIENT
Start: 2022-10-06 | End: 2022-10-13

## 2022-10-06 RX ORDER — ONDANSETRON 4 MG/1
4 TABLET, ORALLY DISINTEGRATING ORAL EVERY 8 HOURS PRN
Qty: 12 TABLET | Refills: 0 | Status: SHIPPED | OUTPATIENT
Start: 2022-10-06 | End: 2023-01-06

## 2022-10-06 RX ORDER — METRONIDAZOLE 500 MG/1
500 TABLET ORAL 3 TIMES DAILY
Qty: 21 TABLET | Refills: 0 | Status: SHIPPED | OUTPATIENT
Start: 2022-10-06 | End: 2022-10-13

## 2022-10-06 RX ORDER — DICYCLOMINE HYDROCHLORIDE 10 MG/1
10 CAPSULE ORAL
Qty: 30 CAPSULE | Refills: 0 | Status: SHIPPED | OUTPATIENT
Start: 2022-10-06 | End: 2022-10-17

## 2022-10-06 NOTE — PROGRESS NOTES
"Chief Complaint   Patient presents with   • Abdominal Pain     Left lower, X 3 days, hx of diverticulosis     Subjective       History of Present Illness     Nicho Molina is a 51 y.o. male who presents with abdominal pain.  Onset 3 days ago.  Has diverticulosis, last flare of diverticulitis was about 2 years ago.  Location: [facility]  Refer: none   Nature: cramp  Eating makes it worse, movement and changing positions make it better.  Associated symptoms: Loose stools, bloating, indigestion, nausea, mouth watering  Denies fever, aches, chills, constipation, vomiting, bloody stools, mucus in stools, problems swallowing, frequent heartburn, dysuria, hematuria, difficulty urinating, flank pain, back pain      The following portions of the patient's history were reviewed and updated as appropriate: allergies, current medications, past family history, past medical history, past social history, past surgical history and problem list.    Review of Systems see HPI    Objective   /90   Pulse 72   Temp 98.3 °F (36.8 °C) (Temporal)   Ht 182.9 cm (72\")   Wt 97.6 kg (215 lb 3.2 oz)   SpO2 98%   BMI 29.19 kg/m²   Body mass index is 29.19 kg/m².  Physical Exam  Constitutional:       General: He is not in acute distress.     Appearance: Normal appearance. He is not ill-appearing, toxic-appearing or diaphoretic.      Comments: NAD, appears as though he does not feel well   HENT:      Head: Normocephalic and atraumatic.      Right Ear: External ear normal.      Left Ear: External ear normal.      Nose: Nose normal.      Mouth/Throat:      Mouth: Mucous membranes are moist.   Eyes:      General:         Right eye: No discharge.         Left eye: No discharge.      Extraocular Movements: Extraocular movements intact.      Conjunctiva/sclera: Conjunctivae normal.   Cardiovascular:      Rate and Rhythm: Normal rate and regular rhythm.   Pulmonary:      Effort: Pulmonary effort is normal.      Breath sounds: Normal breath " sounds.   Abdominal:      General: Bowel sounds are decreased. There is distension.      Palpations: Abdomen is soft. There is no mass.      Tenderness: There is abdominal tenderness in the left lower quadrant. There is no right CVA tenderness, left CVA tenderness, guarding or rebound. Negative signs include Sampson's sign and McBurney's sign.      Hernia: No hernia is present.   Musculoskeletal:         General: Normal range of motion.      Cervical back: Normal range of motion and neck supple.   Lymphadenopathy:      Cervical: No cervical adenopathy.   Skin:     General: Skin is warm and dry.   Neurological:      Mental Status: He is alert and oriented to person, place, and time.   Psychiatric:         Mood and Affect: Mood normal.         Behavior: Behavior normal.         Assessment & Plan   Nicho Molina is here today and the following problems have been addressed:        Diagnoses and all orders for this visit:    1. Diverticulitis of large intestine without perforation or abscess without bleeding (Primary)  -     metroNIDAZOLE (Flagyl) 500 MG tablet; Take 1 tablet by mouth 3 (Three) Times a Day for 7 days.  Dispense: 21 tablet; Refill: 0  -     ciprofloxacin (Cipro) 500 MG tablet; Take 1 tablet by mouth 2 (Two) Times a Day for 7 days.  Dispense: 14 tablet; Refill: 0  -     dicyclomine (Bentyl) 10 MG capsule; Take 1 capsule by mouth 4 (Four) Times a Day Before Meals & at Bedtime As Needed (abdominal cramping).  Dispense: 30 capsule; Refill: 0  -     ondansetron ODT (ZOFRAN-ODT) 4 MG disintegrating tablet; Place 1 tablet on the tongue Every 8 (Eight) Hours As Needed for Nausea or Vomiting.  Dispense: 12 tablet; Refill: 0    2. Left lower quadrant abdominal pain  -     POC Urinalysis Dipstick, Automated  -     dicyclomine (Bentyl) 10 MG capsule; Take 1 capsule by mouth 4 (Four) Times a Day Before Meals & at Bedtime As Needed (abdominal cramping).  Dispense: 30 capsule; Refill: 0    UA clear.  Differential  diagnosis discussed.  Appears to be a flare of diverticulitis based on history, symptoms of present illness.  Patient is afebrile and in no acute distress.  He has been eating a soft diet but still having symptoms x3 days and worsening.  We will go ahead and treat with antibiotics, Bentyl for cramping, Zofran for nausea.  Instructed patient he needs to drink a liquid diet for the next week to allow his gut to rest while it is healing.  To ER with any fever, worsening/severe abdominal pain, nausea/vomiting and unable to keep food/drink down.    Follow Up    Return if symptoms worsen or fail to improve.  Patient was given instructions and counseling regarding his condition or for health maintenance advice. Please see specific information pulled into the AVS if appropriate.     Laura GARCIA  [facility] [place]

## 2022-10-13 ENCOUNTER — TELEPHONE (OUTPATIENT)
Dept: SURGERY | Facility: CLINIC | Age: 52
End: 2022-10-13

## 2022-10-18 ENCOUNTER — ANESTHESIA EVENT (OUTPATIENT)
Dept: GASTROENTEROLOGY | Facility: HOSPITAL | Age: 52
End: 2022-10-18

## 2022-10-18 ENCOUNTER — HOSPITAL ENCOUNTER (OUTPATIENT)
Facility: HOSPITAL | Age: 52
Setting detail: HOSPITAL OUTPATIENT SURGERY
Discharge: HOME | End: 2022-10-18
Attending: SURGERY | Admitting: SURGERY

## 2022-10-18 ENCOUNTER — ANESTHESIA (OUTPATIENT)
Dept: GASTROENTEROLOGY | Facility: HOSPITAL | Age: 52
End: 2022-10-18

## 2022-10-18 VITALS
WEIGHT: 215 LBS | RESPIRATION RATE: 18 BRPM | DIASTOLIC BLOOD PRESSURE: 98 MMHG | SYSTOLIC BLOOD PRESSURE: 147 MMHG | TEMPERATURE: 97 F | OXYGEN SATURATION: 95 % | BODY MASS INDEX: 29.12 KG/M2 | HEART RATE: 53 BPM | HEIGHT: 72 IN

## 2022-10-18 DIAGNOSIS — Z80.0 FAMILY HISTORY OF COLON CANCER IN MOTHER: ICD-10-CM

## 2022-10-18 DIAGNOSIS — Z86.010 HISTORY OF ADENOMATOUS POLYP OF COLON: ICD-10-CM

## 2022-10-18 DIAGNOSIS — Z12.11 COLON CANCER SCREENING: ICD-10-CM

## 2022-10-18 PROCEDURE — 88305 TISSUE EXAM BY PATHOLOGIST: CPT

## 2022-10-18 PROCEDURE — 25010000002 PROPOFOL 200 MG/20ML EMULSION: Performed by: NURSE ANESTHETIST, CERTIFIED REGISTERED

## 2022-10-18 PROCEDURE — 45380 COLONOSCOPY AND BIOPSY: CPT | Performed by: SURGERY

## 2022-10-18 PROCEDURE — S0260 H&P FOR SURGERY: HCPCS | Performed by: SURGERY

## 2022-10-18 PROCEDURE — 25010000002 ONDANSETRON PER 1 MG: Performed by: NURSE ANESTHETIST, CERTIFIED REGISTERED

## 2022-10-18 RX ORDER — SODIUM CHLORIDE 0.9 % (FLUSH) 0.9 %
10 SYRINGE (ML) INJECTION AS NEEDED
Status: DISCONTINUED | OUTPATIENT
Start: 2022-10-18 | End: 2022-10-18 | Stop reason: HOSPADM

## 2022-10-18 RX ORDER — PROPOFOL 10 MG/ML
INJECTION, EMULSION INTRAVENOUS AS NEEDED
Status: DISCONTINUED | OUTPATIENT
Start: 2022-10-18 | End: 2022-10-18 | Stop reason: SURG

## 2022-10-18 RX ORDER — SODIUM CHLORIDE, SODIUM LACTATE, POTASSIUM CHLORIDE, CALCIUM CHLORIDE 600; 310; 30; 20 MG/100ML; MG/100ML; MG/100ML; MG/100ML
50 INJECTION, SOLUTION INTRAVENOUS CONTINUOUS
Status: DISCONTINUED | OUTPATIENT
Start: 2022-10-18 | End: 2022-10-18 | Stop reason: HOSPADM

## 2022-10-18 RX ORDER — ONDANSETRON 2 MG/ML
INJECTION INTRAMUSCULAR; INTRAVENOUS AS NEEDED
Status: DISCONTINUED | OUTPATIENT
Start: 2022-10-18 | End: 2022-10-18 | Stop reason: SURG

## 2022-10-18 RX ORDER — LIDOCAINE HYDROCHLORIDE 20 MG/ML
INJECTION, SOLUTION INTRAVENOUS AS NEEDED
Status: DISCONTINUED | OUTPATIENT
Start: 2022-10-18 | End: 2022-10-18 | Stop reason: SURG

## 2022-10-18 RX ORDER — SODIUM CHLORIDE 0.9 % (FLUSH) 0.9 %
10 SYRINGE (ML) INJECTION EVERY 12 HOURS SCHEDULED
Status: DISCONTINUED | OUTPATIENT
Start: 2022-10-18 | End: 2022-10-18 | Stop reason: HOSPADM

## 2022-10-18 RX ORDER — SIMETHICONE 80 MG
80 TABLET,CHEWABLE ORAL ONCE
Status: COMPLETED | OUTPATIENT
Start: 2022-10-18 | End: 2022-10-18

## 2022-10-18 RX ADMIN — PROPOFOL 50 MG: 10 INJECTION, EMULSION INTRAVENOUS at 09:45

## 2022-10-18 RX ADMIN — PROPOFOL 50 MG: 10 INJECTION, EMULSION INTRAVENOUS at 09:57

## 2022-10-18 RX ADMIN — LIDOCAINE HYDROCHLORIDE 60 MG: 20 INJECTION, SOLUTION INTRAVENOUS at 09:27

## 2022-10-18 RX ADMIN — PROPOFOL 50 MG: 10 INJECTION, EMULSION INTRAVENOUS at 09:39

## 2022-10-18 RX ADMIN — SIMETHICONE 80 MG: 80 TABLET, CHEWABLE ORAL at 10:54

## 2022-10-18 RX ADMIN — SODIUM CHLORIDE, POTASSIUM CHLORIDE, SODIUM LACTATE AND CALCIUM CHLORIDE 50 ML/HR: 600; 310; 30; 20 INJECTION, SOLUTION INTRAVENOUS at 07:34

## 2022-10-18 RX ADMIN — PROPOFOL 50 MG: 10 INJECTION, EMULSION INTRAVENOUS at 09:51

## 2022-10-18 RX ADMIN — PROPOFOL 50 MG: 10 INJECTION, EMULSION INTRAVENOUS at 09:33

## 2022-10-18 RX ADMIN — ONDANSETRON 4 MG: 2 INJECTION INTRAMUSCULAR; INTRAVENOUS at 10:01

## 2022-10-18 RX ADMIN — PROPOFOL 100 MG: 10 INJECTION, EMULSION INTRAVENOUS at 09:27

## 2022-10-18 NOTE — ADDENDUM NOTE
Addendum  created 10/18/22 1047 by Yina Fortune, CRNA    Order list changed, Pharmacy for encounter modified

## 2022-10-18 NOTE — ANESTHESIA POSTPROCEDURE EVALUATION
Patient: Nicho Molina    Procedure Summary     Date: 10/18/22 Room / Location: [facility] ENDOSCOPY 3 / [facility]    Anesthesia Start: 0920 Anesthesia Stop: 1009    Procedure: COLONOSCOPY WITH POLYPECTOMY Diagnosis:       Family history of colon cancer in mother      History of adenomatous polyp of colon      Colon cancer screening      (Family history of colon cancer in mother [Z80.0])      (History of adenomatous polyp of colon [Z86.010])      (Colon cancer screening [Z12.11])    Surgeons: Tatyana Tao MD Provider: Yina Fortune CRNA    Anesthesia Type: MAC ASA Status: 2          Anesthesia Type: MAC    Vitals  Vitals Value Taken Time   /92 10/18/22 1011   Temp 97 °F (36.1 °C) 10/18/22 1011   Pulse 55 10/18/22 1011   Resp 16 10/18/22 1011   SpO2 96 % 10/18/22 1011           Post Anesthesia Care and Evaluation    Patient location during evaluation: PHASE II  Patient participation: complete - patient participated  Level of consciousness: awake and alert  Pain score: 0  Pain management: satisfactory to patient    Airway patency: patent  Anesthetic complications: No anesthetic complications  PONV Status: none  Cardiovascular status: acceptable and stable  Respiratory status: acceptable  Hydration status: acceptable    Comments: Vitals signs as noted in nursing documentation as per protocol.

## 2022-10-18 NOTE — ANESTHESIA PREPROCEDURE EVALUATION
Anesthesia Evaluation     Patient summary reviewed and Nursing notes reviewed   NPO Solid Status: > 8 hours  NPO Liquid Status: > 8 hours           Airway   Mallampati: I  TM distance: >3 FB  Neck ROM: full  No difficulty expected  Dental - normal exam     Pulmonary     breath sounds clear to auscultation  (+) sleep apnea on CPAP,   (-) not a smoker  Cardiovascular   Exercise tolerance: good (4-7 METS)    Rhythm: regular  Rate: normal    (+) hyperlipidemia,       Neuro/Psych- negative ROS  GI/Hepatic/Renal/Endo - negative ROS     Musculoskeletal     Abdominal     Abdomen: soft.   Substance History - negative use     OB/GYN          Other   arthritis,                      Anesthesia Plan    ASA 2     MAC     intravenous induction     Pre-procedure education provided  Plan discussed with CRNA.        CODE STATUS:

## 2022-10-18 NOTE — H&P
"General Surgery     Name:Nicho Molina  Age: 52 y.o.  Gender: male  : 1970  MRN: [id number]  Visit Number: [id number]  Admit Date: 10/18/2022  Date of Service: 10/18/22    Patient Care Team:  Heavenly Mcdonough MD as PCP - General (Family Medicine)      Chief complaint: colon cancer surveillance      History of Present Illness:     Nicho Molina is a 52 y.o. male patient who presents for scheduled colonoscopy for the purposes of colon cancer surveillance.  In brief, he has a personal history of colon polyps, and a family history of colon cancer.  This is detailed in my previous notes from his office visit dated 8/10/2022.  Apparently, since that office visit, he has had an episode of diverticulitis, and was treated with antibiotics.  He reports that he finished his antibiotics 13 days ago.  Unfortunately, he did not inform our office of this development.  It appears that he was evaluated by primary care on 10/6/2022 at which time he had 3 days of symptoms.  He was given 1 week of antibiotic therapy for suspected diverticulitis.        From my notes dated 8/10/2022:   \"Mr. Molina is a 51-year-old gentleman who comes the office today to discuss colonoscopy.  It appears from his chart that we attempted to contact him and schedule him via the open access colonoscopy pathway, we were unsuccessful in reaching him.  Thus, he was scheduled for an office visit today.  The patient's last colonoscopy was performed by Dr. Shetty on 2019.  At that time, the indication was colon cancer screening in a patient with a personal history of colon polyps, and a family history of colon cancer.  A 4 mm sessile polyp was removed from the rectum.  Internal hemorrhoids were noted.  He was also noted to have diverticulosis of the left colon without perforation, abscess, or bleeding.  High-fiber diet was recommended, and repeat colonoscopy was recommended in 3 years.  Pathology demonstrated the polyp to be " "hyperplastic in nature.     He reports that he has had 7 or 8 previous colonoscopies.  He reports having a large adenomatous polyp removed during his first exam, which was followed up in 6 months.  He was then followed up 1 year later, and since then it has been about every 3 years.  He reports having polyps removed during each of his exams, but thinks that only on the first exam did he have any adenomas removed.  As detailed above, during his last exam, the polyp removed was hyperplastic.  He has no current complaints related to his bowel habits.  He does tell me that he has had 2 episodes of diverticulitis  by about a 6-week timeframe after his last colonoscopy.  This did not require hospitalization, and was managed on an outpatient basis.  He reports resolution with antibiotic therapy, and has had no recurrent issues.\"    Past Medical History:   Diagnosis Date   • Arthritis    • Colon polyp    • Diverticulosis    • Gout    • History of medical problems 2017    Gout, heel spurs   • HL (hearing loss) 2017    getting worse   • Hyperlipidemia    • Seasonal allergies    • Sleep apnea        Past Surgical History:   Procedure Laterality Date   • CHOLESTEATOMA EXCISION Right 1981, 1984, 1989    Cholesteatoma   • COLONOSCOPY  2015    every 2-3 years   • COLONOSCOPY W/ POLYPECTOMY  06/25/2019    hyperplastic rectal polyp, left sided diverticulosis, internal hemorrhoids - Dr. Kayode Shetty       Family History   Problem Relation Age of Onset   • Colon cancer Mother 42   • Early death Mother    • Heart attack Father 63   • Hyperlipidemia Father    • Hypertension Father    • Heart disease Father    • Alcohol abuse Father    • COPD Father    • No Known Problems Sister    • Alcohol abuse Brother    • COPD Maternal Aunt    • No Known Problems Maternal Aunt    • No Known Problems Maternal Aunt    • Hyperlipidemia Maternal Uncle    • Hypertension Maternal Uncle    • Heart disease Maternal Uncle        Social History "     Socioeconomic History   • Marital status:    Tobacco Use   • Smoking status: Never   • Smokeless tobacco: Current     Types: Snuff   • Tobacco comments:     Smokeless tobacco; [place] snuff   Vaping Use   • Vaping Use: Never used   Substance and Sexual Activity   • Alcohol use: Yes     Alcohol/week: 6.0 standard drinks     Types: 6 Cans of beer per week     Comment: 6 a week   • Drug use: No   • Sexual activity: Yes     Partners: Female     Birth control/protection: Surgical         Current Facility-Administered Medications:   •  lactated ringers infusion, 50 mL/hr, Intravenous, Continuous, Tatyana Tao MD, Last Rate: 50 mL/hr at 10/18/22 0734, 50 mL/hr at 10/18/22 0734    Medications Prior to Admission   Medication Sig Dispense Refill Last Dose   • bisacodyl (Dulcolax) 5 MG EC tablet Take 2 tablets at 3 pm and 2 tablets at 7 pm the day prior to colonoscopy 4 tablet 0 10/17/2022 at 1900   • multivitamin with minerals tablet tablet Take 1 tablet by mouth Daily.   10/17/2022 at 0800   • ondansetron ODT (ZOFRAN-ODT) 4 MG disintegrating tablet Place 1 tablet on the tongue Every 8 (Eight) Hours As Needed for Nausea or Vomiting. 12 tablet 0 Past Week   • polyethylene glycol (MiraLax) 17 g packet Mix 238 gram powder with 64 oz of clear liquid starting at 5 pm. Drink 8 oz every 10-15 minutes until consumed. 238 each 0 10/17/2022 at 2000   • rosuvastatin (Crestor) 10 MG tablet Take 1 tablet by mouth Every Night. To lower cholesterol and risk of stroke. 90 tablet 3 10/16/2022 at 0900       No Known Allergies    Review of Systems   Constitutional: Negative.    HENT: Negative.    Eyes: Negative.    Respiratory: Negative.    Cardiovascular: Negative.    Gastrointestinal: Negative.    Endocrine: Negative.    Genitourinary: Negative.    Musculoskeletal: Negative.    Skin: Negative.    Allergic/Immunologic: Negative.    Neurological: Negative.    Hematological: Negative.    Psychiatric/Behavioral: Negative.         OBJECTIVE:     Vital Signs  Temp:  [97.9 °F (36.6 °C)] 97.9 °F (36.6 °C)  Heart Rate:  [62] 62  Resp:  [16] 16  BP: (125)/(94) 125/94    No intake/output data recorded.  No intake/output data recorded.      Physical Exam:   General Appearance alert, appears stated age and cooperative  Head normocephalic, without obvious abnormality and atraumatic  Eyes lids and lashes normal, conjunctivae and sclerae normal, no icterus and no pallor  Lungs respirations regular, respirations even and respirations unlabored  Heart regular rhythm & normal rate  Abdomen soft non-tender, no guarding and no rebound tenderness  Extremities moves extremities well, no edema, no cyanosis and no redness  Skin no bleeding, bruising or rash  Neurologic Mental Status orientated to person, place, time and situation    Results Review:  I have reviewed the entirety of the patient's clinical lab results.  I have also personally reviewed the patient's imaging      Lab Results (last 72 hours)     ** No results found for the last 72 hours. **                          ASSESSMENT/PLAN:      Family history of colon cancer in mother    History of adenomatous polyp of colon    Colon cancer screening      We discussed colonoscopy for colon cancer screening/surveillance purposes.  We discussed the indications for colonoscopy as well as the risks, benefits and alternatives to this procedure. Risks including but not limited to perforation, bleeding,need for blood transfusion or emergent surgery ,and missed neoplasm were reviewed in detail with the patient.  The patient was informed that due to his recent episode of diverticulitis, there was a probability that we would be unable to complete his colonoscopy today due to persistent inflammatory change in the sigmoid colon given the close proximity in time his most recent episode to today's examination.  I explained to him that had I known this was the case, we would have rescheduled his procedure.  He  understands.  I advised him that we will proceed as he has completed the necessary bowel preparation, and is here for the procedure. The patient was given an opportunity to ask questions.  The patient verbalized understanding of these recommendations and the plan of care. The patient is willing to proceed with colonoscopy and has signed informed consent.        Tatyana Tao MD  10/18/22  08:01 EDT

## 2022-10-19 LAB — REF LAB TEST METHOD: NORMAL

## 2023-01-06 ENCOUNTER — PATIENT MESSAGE (OUTPATIENT)
Dept: INTERNAL MEDICINE | Facility: CLINIC | Age: 53
End: 2023-01-06

## 2023-01-06 ENCOUNTER — OFFICE VISIT (OUTPATIENT)
Dept: INTERNAL MEDICINE | Facility: CLINIC | Age: 53
End: 2023-01-06
Payer: OTHER GOVERNMENT

## 2023-01-06 VITALS
RESPIRATION RATE: 16 BRPM | TEMPERATURE: 98 F | BODY MASS INDEX: 28.88 KG/M2 | WEIGHT: 213.2 LBS | SYSTOLIC BLOOD PRESSURE: 124 MMHG | OXYGEN SATURATION: 95 % | DIASTOLIC BLOOD PRESSURE: 82 MMHG | HEIGHT: 72 IN | HEART RATE: 81 BPM

## 2023-01-06 DIAGNOSIS — M10.9 ACUTE GOUT INVOLVING TOE OF RIGHT FOOT, UNSPECIFIED CAUSE: Primary | ICD-10-CM

## 2023-01-06 PROCEDURE — 99213 OFFICE O/P EST LOW 20 MIN: CPT | Performed by: FAMILY MEDICINE

## 2023-01-06 RX ORDER — COLCHICINE 0.6 MG/1
TABLET ORAL
Qty: 3 TABLET | Refills: 2 | Status: SHIPPED | OUTPATIENT
Start: 2023-01-06

## 2023-01-06 RX ORDER — INDOMETHACIN 50 MG/1
50 CAPSULE ORAL 3 TIMES DAILY PRN
Qty: 15 CAPSULE | Refills: 2 | Status: SHIPPED | OUTPATIENT
Start: 2023-01-06 | End: 2023-01-11

## 2023-01-06 NOTE — PROGRESS NOTES
Chief Complaint  Toe Pain (Right big toe, started new years randal, much worse as of this morning )    Subjective        Nicho Molina presents to [facility]  History of Present Illness  History of gout on left great toe. Today comes in for pain/swelling/redness of right great toe joint. Pain started new years, has worsened. Has been taking motrin, not helping.       Objective   Vital Signs:  /82 (BP Location: Left arm, Patient Position: Sitting, Cuff Size: Adult)   Pulse 81   Temp 98 °F (36.7 °C) (Temporal)   Resp 16   Ht 182.9 cm (72\")   Wt 96.7 kg (213 lb 3.2 oz)   SpO2 95%   BMI 28.92 kg/m²   Estimated body mass index is 28.92 kg/m² as calculated from the following:    Height as of this encounter: 182.9 cm (72\").    Weight as of this encounter: 96.7 kg (213 lb 3.2 oz).          Physical Exam  Vitals and nursing note reviewed.   Constitutional:       General: He is not in acute distress.     Appearance: Normal appearance. He is well-developed and well-groomed. He is not ill-appearing, toxic-appearing or diaphoretic.      Interventions: Face mask in place.   HENT:      Head: Normocephalic and atraumatic.      Right Ear: Hearing normal.      Left Ear: Hearing normal.   Eyes:      General: Lids are normal. No scleral icterus.        Right eye: No discharge.         Left eye: No discharge.      Extraocular Movements: Extraocular movements intact.   Pulmonary:      Effort: Pulmonary effort is normal.   Musculoskeletal:      Cervical back: Neck supple.      Right foot: Swelling (1st mtp joint with redness) present.      Left foot: Normal.   Skin:     Coloration: Skin is not jaundiced or pale.   Neurological:      General: No focal deficit present.      Mental Status: He is alert and oriented to person, place, and time.   Psychiatric:         Attention and Perception: Attention and perception normal.         Mood and Affect: Mood and affect normal.         Speech: Speech  normal.         Behavior: Behavior normal. Behavior is cooperative.         Thought Content: Thought content normal.         Cognition and Memory: Cognition and memory normal.         Judgment: Judgment normal.        Result Review :                Assessment and Plan   Diagnoses and all orders for this visit:    1. Acute gout involving toe of right foot, unspecified cause (Primary)  -     colchicine (Colcrys) 0.6 MG tablet; TAKE 1.2 MG (2 TAB) PO FOLLOWED BY 0.6 MG (1 TAB) IN ONE HOUR FOR GOUT FLARE  Dispense: 3 tablet; Refill: 2  -     indomethacin (INDOCIN) 50 MG capsule; Take 1 capsule by mouth 3 (Three) Times a Day As Needed (gout pain) for up to 5 days.  Dispense: 15 capsule; Refill: 2             Follow Up   Return for As Needed.  Patient was given instructions and counseling regarding his condition or for health maintenance advice. Please see specific information pulled into the AVS if appropriate.

## 2023-03-02 ENCOUNTER — OFFICE VISIT (OUTPATIENT)
Dept: INTERNAL MEDICINE | Facility: CLINIC | Age: 53
End: 2023-03-02
Payer: OTHER GOVERNMENT

## 2023-03-02 VITALS
BODY MASS INDEX: 28.71 KG/M2 | SYSTOLIC BLOOD PRESSURE: 138 MMHG | HEIGHT: 72 IN | HEART RATE: 84 BPM | OXYGEN SATURATION: 98 % | TEMPERATURE: 98.6 F | WEIGHT: 212 LBS | DIASTOLIC BLOOD PRESSURE: 84 MMHG

## 2023-03-02 DIAGNOSIS — M76.51 PATELLAR TENDINITIS OF RIGHT KNEE: Primary | ICD-10-CM

## 2023-03-02 PROCEDURE — 99213 OFFICE O/P EST LOW 20 MIN: CPT | Performed by: INTERNAL MEDICINE

## 2023-03-02 RX ORDER — METHYLPREDNISOLONE 4 MG/1
TABLET ORAL
Qty: 1 EACH | Refills: 0 | Status: SHIPPED | OUTPATIENT
Start: 2023-03-02

## 2023-03-02 NOTE — PROGRESS NOTES
"Chief Complaint   Patient presents with   • Abstract     Pt's had right knee pain X 3 days.     Subjective   Nicho Molina is a 52 y.o. male.     History of Present Illness  Patient here today with complaints of right knee pain for last 3 days.  Known history of osteoarthritis of left knee for which he has seen orthopedics in the past.  No known injury.  He has been very active lately with yard work.  Possible overuse.  The knee is red, warm and swollen.    The knee awoke him last night.  He took IBU and used ice and fell back to sleep. Ice is helpful.   He took indomethacin a few days ago and it did not help.         The following portions of the patient's history were reviewed and updated as appropriate: allergies, current medications, past family history, past medical history, past social history, past surgical history and problem list.    Review of Systems   Constitutional: Positive for activity change. Negative for chills and fever.   Musculoskeletal: Positive for arthralgias and joint swelling.       Objective   /84   Pulse 84   Temp 98.6 °F (37 °C)   Ht 182.9 cm (72\")   Wt 96.2 kg (212 lb)   SpO2 98%   BMI 28.75 kg/m²   Body mass index is 28.75 kg/m².  Physical Exam  Vitals and nursing note reviewed.   Constitutional:       General: He is not in acute distress.     Appearance: Normal appearance. He is not ill-appearing.      Comments: Kind and pleasant man, no obvious distress   HENT:      Right Ear: External ear normal.      Left Ear: External ear normal.   Eyes:      General:         Right eye: No discharge.         Left eye: No discharge.      Extraocular Movements: Extraocular movements intact.   Pulmonary:      Effort: Pulmonary effort is normal. No respiratory distress.   Musculoskeletal:         General: Swelling and tenderness present.      Comments: Right knee with inflammation noted just inferior to patella, there is redness and warmth over patellar tendon inferior to patella, no " ballottement or effusion, no pain of suprapatellar area, no pain over collateral ligaments, he has mild pain with full extension and flexion due to tightness or inflammation, antalgic gait noted due to pain   Neurological:      General: No focal deficit present.      Mental Status: He is alert and oriented to person, place, and time. Mental status is at baseline.      Gait: Gait abnormal.      Comments: Antalgic gait due to right knee pain   Psychiatric:         Mood and Affect: Mood normal.         Assessment & Plan   Nicho Molina is here today and the following problems have been addressed:      Diagnoses and all orders for this visit:    1. Patellar tendinitis of right knee (Primary)    Other orders  -     methylPREDNISolone (MEDROL) 4 MG dose pack; Take as directed on package instructions.  Dispense: 1 each; Refill: 0  -     diclofenac (VOLTAREN) 50 MG EC tablet; Take 1 tablet by mouth 2 (Two) Times a Day As Needed (for knee pain).  Dispense: 20 tablet; Refill: 1    Recommend frictional ice rub for 5 minutes every 2-3 hours over patellar tendon  Recommend knee sleeve on during day and off at night while ambulating  Elevate leg as tolerated  Recommend Medrol Dosepak to take as discussed  Provided Voltaren anti-inflammatory 1 tablet twice daily with food as needed for inflammation and pain    Return to clinic as needed if symptoms worsen or persist    Part of this note may be an electronic transcription/translation of spoken language to printed text using the Dragon Dictation System.

## 2023-06-19 ENCOUNTER — OFFICE VISIT (OUTPATIENT)
Dept: INTERNAL MEDICINE | Facility: CLINIC | Age: 53
End: 2023-06-19
Payer: OTHER GOVERNMENT

## 2023-06-19 VITALS
HEART RATE: 78 BPM | TEMPERATURE: 98 F | OXYGEN SATURATION: 97 % | HEIGHT: 72 IN | SYSTOLIC BLOOD PRESSURE: 122 MMHG | DIASTOLIC BLOOD PRESSURE: 82 MMHG | BODY MASS INDEX: 30.07 KG/M2 | WEIGHT: 222 LBS

## 2023-06-19 DIAGNOSIS — H92.02 LEFT EAR PAIN: Primary | ICD-10-CM

## 2023-06-19 DIAGNOSIS — J30.9 ALLERGIC RHINITIS, UNSPECIFIED SEASONALITY, UNSPECIFIED TRIGGER: ICD-10-CM

## 2023-06-19 PROCEDURE — 99213 OFFICE O/P EST LOW 20 MIN: CPT | Performed by: FAMILY MEDICINE

## 2023-06-19 RX ORDER — METHYLPREDNISOLONE 4 MG/1
TABLET ORAL
Qty: 1 EACH | Refills: 0 | Status: SHIPPED | OUTPATIENT
Start: 2023-06-19

## 2023-06-19 NOTE — ASSESSMENT & PLAN NOTE
Likely secondary to a flare of allergies. He has tried over the counter medications with antihistamine nasal spray, a long acting antihistamine and Sudafed without relief. We will treat with a short course of steroids.

## 2023-06-19 NOTE — PROGRESS NOTES
Nicho Molina is a 52 y.o. male.    Chief Complaint   Patient presents with    Earache     Left ear, feels like it has standing fluid.       HPI     Nicho Molina is a 52-year-old male who presents today complaining of a left earache.     The patient reports having seasonal allergies with recent nasal congestion and left ear fullness. His nasal congestion resolved 2 to 3 days ago after using the nasal spray. He has been taking Sudafed every 4 hours and an antihistamine every day, but his ear still feels full, and he has postnasal drainage. He denies fever, cough, or vertigo. The patient mentions he was hit in the jaw 2 weeks ago while playing basketball and inquires if that may have contributed to his ear fullness. Of note, he tolerates oral steroids well, and was prescribed a course 2 months ago by Dr. Vermeesch for his right knee.     The following portions of the patient's history were reviewed and updated as appropriate: allergies, current medications, past family history, past medical history, past social history, past surgical history and problem list.     No Known Allergies      Current Outpatient Medications:     colchicine (Colcrys) 0.6 MG tablet, TAKE 1.2 MG (2 TAB) PO FOLLOWED BY 0.6 MG (1 TAB) IN ONE HOUR FOR GOUT FLARE, Disp: 3 tablet, Rfl: 2    diclofenac (VOLTAREN) 50 MG EC tablet, Take 1 tablet by mouth 2 (Two) Times a Day As Needed (for knee pain)., Disp: 20 tablet, Rfl: 1    multivitamin with minerals tablet tablet, Take 1 tablet by mouth Daily., Disp: , Rfl:     ROS    Review of Systems   Constitutional: Negative for fever.   HENT: Positive for congestion (bilateral ears), ear pain (left) and postnasal drip.    Respiratory: Negative for cough.    Allergic/Immunologic: Positive for environmental allergies.   Neurological: Negative for dizziness.       Vitals:    06/19/23 1449   BP: 122/82   BP Location: Left arm   Patient Position: Sitting   Cuff Size: Adult   Pulse: 78   Temp: 98 °F (36.7  "°C)   SpO2: 97%   Weight: 101 kg (222 lb)   Height: 182.9 cm (72\")     Body mass index is 30.11 kg/m².    Physical Exam     Physical Exam  Constitutional:       General: He is not in acute distress.     Appearance: He is well-developed.   HENT:      Head: Normocephalic and atraumatic.      Comments: Palpable click appreciated to left TMJ on opening jaw.      Right Ear: Tympanic membrane and external ear normal.      Left Ear: Tympanic membrane and external ear normal.      Mouth/Throat:      Pharynx: Posterior oropharyngeal erythema (trace) present.   Eyes:      Extraocular Movements: Extraocular movements intact.      Conjunctiva/sclera: Conjunctivae normal.   Cardiovascular:      Rate and Rhythm: Normal rate and regular rhythm.      Heart sounds: No murmur heard.  Pulmonary:      Effort: Pulmonary effort is normal. No respiratory distress.      Breath sounds: Normal breath sounds. No wheezing.   Abdominal:      General: Bowel sounds are normal. There is no distension.      Palpations: Abdomen is soft.      Tenderness: There is no abdominal tenderness.   Musculoskeletal:      Cervical back: Neck supple.   Lymphadenopathy:      Cervical: No cervical adenopathy.   Skin:     General: Skin is warm and dry.   Neurological:      Mental Status: He is alert and oriented to person, place, and time.      Cranial Nerves: No cranial nerve deficit.   Psychiatric:         Mood and Affect: Mood normal.         Behavior: Behavior normal.       Assessment/Plan    Diagnoses and all orders for this visit:    1. Left ear pain (Primary)  Assessment & Plan:  Likely secondary to a flare of allergies. He has tried over the counter medications with antihistamine nasal spray, a long acting antihistamine and Sudafed without relief. We will treat with a short course of steroids.      2. Allergic rhinitis, unspecified seasonality, unspecified trigger    Other orders  -     methylPREDNISolone (MEDROL) 4 MG dose pack; Take as directed on package " instructions.  Dispense: 1 each; Refill: 0      New Medications Ordered This Visit   Medications    methylPREDNISolone (MEDROL) 4 MG dose pack     Sig: Take as directed on package instructions.     Dispense:  1 each     Refill:  0       No orders of the defined types were placed in this encounter.      Return if symptoms worsen or fail to improve.    Stefany Cortez DO     Transcribed from ambient dictation for Stefany Cortez DO by Joleen Vivas.  06/19/23   15:51 EDT    Patient or patient representative verbalized consent to the visit recording.  I have personally performed the services described in this document as transcribed by the above individual, and it is both accurate and complete.  Stefany Cortez DO  6/19/2023  20:32 EDT

## 2023-08-09 ENCOUNTER — TELEPHONE (OUTPATIENT)
Dept: INTERNAL MEDICINE | Facility: CLINIC | Age: 53
End: 2023-08-09
Payer: OTHER GOVERNMENT

## 2023-08-09 ENCOUNTER — PATIENT MESSAGE (OUTPATIENT)
Dept: INTERNAL MEDICINE | Facility: CLINIC | Age: 53
End: 2023-08-09
Payer: OTHER GOVERNMENT

## 2023-08-09 NOTE — TELEPHONE ENCOUNTER
Caller: NICOLAS Santillan call back number:  [phone]     Who are you requesting to speak with (clinical staff, provider,  specific staff member): CLINICAL     PATIENT IS ON DAY 2 OF A DIVERTICULITIS FLARE UP    PAIN LEVEL IS A 1 OUT OF 10   NO OTHER SYMPTOMS JUST ABDOMINAL PAIN     THIS IS HIS 4TH EPISODE   HE HAS SOME LEFT OVER MEDICATION IF DR MORA WANTS HIM TO TAKE IT   OR IF SHE WANTS TO SEE HIM     Is it okay if the provider responds through MyChart:  YES    [facility] CONFIRMED

## 2023-08-10 ENCOUNTER — TELEPHONE (OUTPATIENT)
Dept: INTERNAL MEDICINE | Facility: CLINIC | Age: 53
End: 2023-08-10

## 2023-08-10 ENCOUNTER — OFFICE VISIT (OUTPATIENT)
Dept: INTERNAL MEDICINE | Facility: CLINIC | Age: 53
End: 2023-08-10
Payer: OTHER GOVERNMENT

## 2023-08-10 VITALS
HEIGHT: 72 IN | DIASTOLIC BLOOD PRESSURE: 92 MMHG | BODY MASS INDEX: 29.39 KG/M2 | TEMPERATURE: 97.7 F | SYSTOLIC BLOOD PRESSURE: 128 MMHG | HEART RATE: 71 BPM | OXYGEN SATURATION: 96 % | WEIGHT: 217 LBS

## 2023-08-10 DIAGNOSIS — K57.32 DIVERTICULITIS OF LARGE INTESTINE WITHOUT PERFORATION OR ABSCESS WITHOUT BLEEDING: Primary | ICD-10-CM

## 2023-08-10 PROCEDURE — 99213 OFFICE O/P EST LOW 20 MIN: CPT | Performed by: STUDENT IN AN ORGANIZED HEALTH CARE EDUCATION/TRAINING PROGRAM

## 2023-08-10 RX ORDER — LEVOFLOXACIN 750 MG/1
750 TABLET ORAL DAILY
Qty: 10 TABLET | Refills: 0 | Status: SHIPPED | OUTPATIENT
Start: 2023-08-10

## 2023-08-10 RX ORDER — LEVOFLOXACIN 750 MG/1
750 TABLET ORAL DAILY
Qty: 10 TABLET | Refills: 0 | Status: SHIPPED | OUTPATIENT
Start: 2023-08-10 | End: 2023-08-10 | Stop reason: SDUPTHER

## 2023-08-10 RX ORDER — METRONIDAZOLE 500 MG/1
500 TABLET ORAL 3 TIMES DAILY
Qty: 30 TABLET | Refills: 0 | Status: SHIPPED | OUTPATIENT
Start: 2023-08-10

## 2023-08-10 NOTE — PROGRESS NOTES
Subjective   Nicho Molina is a 52 y.o. male.     History of Present Illness  Patient presents with 4 days of left lower quadrant abdominal pain.  Started a liquid diet yesterday.  He has had 4 episodes of diverticulitis in the past.  States that they feel just like this.  States he has had no blood in his stool.  So far is having the abdominal pain rates it as a 4 with no radiation.  States he noticed it started the day after eating a fried green tomato.    The following portions of the patient's history were reviewed and updated as appropriate: allergies, current medications, past family history, past medical history, past social history, past surgical history, and problem list.    Review of Systems   HENT:  Negative for congestion and sore throat.    Respiratory:  Negative for cough.    Neurological:  Negative for headaches.   All other systems reviewed and are negative.    Objective   Physical Exam  Vitals and nursing note reviewed.   Constitutional:       Appearance: Normal appearance. He is normal weight.   HENT:      Head: Normocephalic and atraumatic.      Right Ear: External ear normal.      Left Ear: External ear normal.      Nose: Nose normal.      Mouth/Throat:      Mouth: Mucous membranes are moist.      Pharynx: Oropharynx is clear.   Eyes:      Extraocular Movements: Extraocular movements intact.      Conjunctiva/sclera: Conjunctivae normal.      Pupils: Pupils are equal, round, and reactive to light.   Cardiovascular:      Rate and Rhythm: Normal rate and regular rhythm.      Pulses: Normal pulses.      Heart sounds: Normal heart sounds. No murmur heard.    No gallop.   Pulmonary:      Effort: Pulmonary effort is normal. No respiratory distress.      Breath sounds: Normal breath sounds. No wheezing, rhonchi or rales.   Abdominal:      General: Abdomen is flat. Bowel sounds are normal.      Palpations: Abdomen is soft.   Musculoskeletal:         General: Normal range of motion.      Cervical  back: Normal range of motion and neck supple. No rigidity or tenderness.   Lymphadenopathy:      Cervical: No cervical adenopathy.   Skin:     General: Skin is warm.      Capillary Refill: Capillary refill takes less than 2 seconds.      Coloration: Skin is not jaundiced or pale.      Findings: No bruising, erythema, lesion or rash.   Neurological:      General: No focal deficit present.      Mental Status: He is alert and oriented to person, place, and time. Mental status is at baseline.   Psychiatric:         Mood and Affect: Mood normal.         Behavior: Behavior normal.         Thought Content: Thought content normal.         Judgment: Judgment normal.       Assessment & Plan   Diagnoses and all orders for this visit:    1. Diverticulitis of large intestine without perforation or abscess without bleeding (Primary)  -     Discontinue: levoFLOXacin (Levaquin) 750 MG tablet; Take 1 tablet by mouth Daily.  Dispense: 10 tablet; Refill: 0  -     metroNIDAZOLE (Flagyl) 500 MG tablet; Take 1 tablet by mouth 3 (Three) Times a Day.  Dispense: 30 tablet; Refill: 0  -     levoFLOXacin (Levaquin) 750 MG tablet; Take 1 tablet by mouth Daily.  Dispense: 10 tablet; Refill: 0    Start Levaquin and Flagyl  Counseled on liquid diet until abdominal pain improves  Then increase diet as tolerated

## 2023-08-10 NOTE — TELEPHONE ENCOUNTER
Called Nicho and scheduled him with Dr. Francois today so he could go ahead and get treatment, Dr. Mcdonough will be out of the office for time he is available.

## 2023-08-10 NOTE — TELEPHONE ENCOUNTER
From: Nicho Molina  To: Heavenly Mcdonough  Sent: 8/9/2023 5:30 PM EDT  Subject: Diverticulitis    Hi. There is a visit summary from my phone call this afternoon but it does not have a future appointment or a prescription.  I also checked the medicine cabinet.  I only have dicyclomine 10 mg for abdominal cramping.   The other stuff was for gout.  Thank you.

## 2024-01-08 ENCOUNTER — PATIENT MESSAGE (OUTPATIENT)
Dept: INTERNAL MEDICINE | Facility: CLINIC | Age: 54
End: 2024-01-08
Payer: OTHER GOVERNMENT

## 2024-01-08 RX ORDER — INDOMETHACIN 50 MG/1
50 CAPSULE ORAL 3 TIMES DAILY PRN
Qty: 15 CAPSULE | Refills: 2 | Status: SHIPPED | OUTPATIENT
Start: 2024-01-08

## 2024-02-09 ENCOUNTER — OFFICE VISIT (OUTPATIENT)
Dept: INTERNAL MEDICINE | Facility: CLINIC | Age: 54
End: 2024-02-09
Payer: OTHER GOVERNMENT

## 2024-02-09 VITALS
DIASTOLIC BLOOD PRESSURE: 84 MMHG | HEART RATE: 75 BPM | OXYGEN SATURATION: 97 % | BODY MASS INDEX: 29.77 KG/M2 | SYSTOLIC BLOOD PRESSURE: 128 MMHG | TEMPERATURE: 97.5 F | HEIGHT: 72 IN | RESPIRATION RATE: 16 BRPM | WEIGHT: 219.8 LBS

## 2024-02-09 DIAGNOSIS — G47.33 OBSTRUCTIVE SLEEP APNEA ON CPAP: ICD-10-CM

## 2024-02-09 DIAGNOSIS — N52.9 VASCULOGENIC ERECTILE DYSFUNCTION, UNSPECIFIED VASCULOGENIC ERECTILE DYSFUNCTION TYPE: ICD-10-CM

## 2024-02-09 DIAGNOSIS — E78.49 FAMILIAL HYPERLIPIDEMIA: ICD-10-CM

## 2024-02-09 DIAGNOSIS — K57.90 DIVERTICULOSIS: ICD-10-CM

## 2024-02-09 DIAGNOSIS — E55.9 VITAMIN D DEFICIENCY: ICD-10-CM

## 2024-02-09 DIAGNOSIS — M10.9 GOUT, UNSPECIFIED CAUSE, UNSPECIFIED CHRONICITY, UNSPECIFIED SITE: ICD-10-CM

## 2024-02-09 DIAGNOSIS — Z00.00 ANNUAL PHYSICAL EXAM: Primary | ICD-10-CM

## 2024-02-09 DIAGNOSIS — R79.9 ABNORMAL BLOOD CHEMISTRY: ICD-10-CM

## 2024-02-09 DIAGNOSIS — Z72.0 TOBACCO USE: ICD-10-CM

## 2024-02-09 DIAGNOSIS — Z51.81 MEDICATION MONITORING ENCOUNTER: ICD-10-CM

## 2024-02-09 DIAGNOSIS — Z12.5 PROSTATE CANCER SCREENING: ICD-10-CM

## 2024-02-09 DIAGNOSIS — R03.0 ELEVATED BLOOD PRESSURE READING: ICD-10-CM

## 2024-02-09 PROBLEM — Z12.11 COLON CANCER SCREENING: Status: RESOLVED | Noted: 2022-08-30 | Resolved: 2024-02-09

## 2024-02-09 PROCEDURE — 99396 PREV VISIT EST AGE 40-64: CPT | Performed by: FAMILY MEDICINE

## 2024-02-09 RX ORDER — DICYCLOMINE HYDROCHLORIDE 10 MG/1
10 CAPSULE ORAL 3 TIMES DAILY PRN
Qty: 30 CAPSULE | Refills: 1 | Status: SHIPPED | OUTPATIENT
Start: 2024-02-09

## 2024-02-09 RX ORDER — SILDENAFIL 25 MG/1
25-50 TABLET, FILM COATED ORAL DAILY PRN
Qty: 15 TABLET | Refills: 5 | Status: SHIPPED | OUTPATIENT
Start: 2024-02-09

## 2024-02-09 NOTE — PROGRESS NOTES
02/09/2024  Chief Complaint   Patient presents with    Annual Exam       Patient Care Team:  Heavenly Mcdonough MD as PCP - General (Family Medicine)]       Nicho Molina is here for his annual preventive exam. History per MA reviewed.     Erectile dysfunction new issue. Has not tried anything.    Was on antibiotics for diverticulitis. Has tried bentyl 10 mg from a friend when abdomen starts to bother him and it has helped. Would like rx. Aware of some foods that bother him (fried green tomatoes).    Blood pressure borderline. Has DENNIS but has not been using CPAP due to previous recall. Doesn't have new machine. Last sleep study approx 6086-1596. Doesn't know what his settings were. Wife is resp therapist and wanting him to get back on therapy. Aware this can raise blood pressure.    Health Maintenance   Topic Date Due    ANNUAL PHYSICAL  07/12/2023    LIPID PANEL  07/13/2023    INFLUENZA VACCINE  03/31/2024 (Originally 8/1/2023)    COVID-19 Vaccine (3 - 2023-24 season) 04/30/2024 (Originally 9/1/2023)    BMI FOLLOWUP  02/09/2025    COLORECTAL CANCER SCREENING  10/18/2025    TDAP/TD VACCINES (5 - Td or Tdap) 06/22/2030    Orthopox/Monkeypox  Completed    ZOSTER VACCINE  Completed    Pneumococcal Vaccine 0-64  Aged Out    HEPATITIS C SCREENING  Discontinued       Immunization History   Administered Date(s) Administered    Anthrax 03/04/2008, 03/20/2008, 04/21/2008, 11/01/2008    COVID-19 (MODERNA) 1st,2nd,3rd Dose Monovalent 03/01/2021, 04/12/2021    Hepatitis A 02/19/1998, 09/12/1998    Hepatitis B Adult/Adolescent IM 02/14/1998, 03/14/1998, 09/12/1998    Influenza Injectable Mdck Pf Quad 10/01/2020    Influenza, Unspecified 10/08/2019, 10/01/2020    MMR 08/05/2002    OPV 09/09/1992, 04/02/2004    PPD Test 06/23/2003, 04/02/2004, 03/04/2008    Shingrix 07/13/2020, 10/13/2020    Smallpox 03/04/2008    Td (TDVAX) 03/10/1990, 03/05/1997, 04/02/2004    Tdap 06/22/2020    Typhoid, Unspecified 03/10/1990,  "02/14/1998, 04/02/2004, 03/04/2008    Varicella Zoster Immune Globulin 04/02/2004    Yellow Fever 03/14/1998    flucelvax quad pfs =>4 YRS 10/08/2019         The following portions of the patient's history were reviewed and updated as appropriate: allergies, current medications, past family history, past medical history, past social history, past surgical history and problem list.    Objective   Visit Vitals  /84 (BP Location: Left arm, Patient Position: Sitting, Cuff Size: Adult)   Pulse 75   Temp 97.5 °F (36.4 °C) (Temporal)   Resp 16   Ht 182.9 cm (72\")   Wt 99.7 kg (219 lb 12.8 oz)   SpO2 97%   BMI 29.81 kg/m²        Physical Exam  Vitals and nursing note reviewed.   Constitutional:       General: He is not in acute distress.     Appearance: Normal appearance. He is well-developed, well-groomed and overweight. obeseHe is not ill-appearing, toxic-appearing or diaphoretic.   HENT:      Head: Normocephalic and atraumatic.      Right Ear: Hearing, tympanic membrane, ear canal and external ear normal.      Left Ear: Hearing, tympanic membrane, ear canal and external ear normal.      Nose: Nose normal.      Mouth/Throat:      Mouth: Mucous membranes are moist.   Eyes:      General: Lids are normal. Gaze aligned appropriately. No scleral icterus.        Right eye: No discharge.         Left eye: No discharge.      Extraocular Movements: Extraocular movements intact.      Conjunctiva/sclera: Conjunctivae normal.      Pupils: Pupils are equal, round, and reactive to light.   Neck:      Thyroid: No thyromegaly.      Trachea: Phonation normal.   Cardiovascular:      Rate and Rhythm: Normal rate and regular rhythm.      Heart sounds: Normal heart sounds.   Pulmonary:      Effort: Pulmonary effort is normal.      Breath sounds: Normal breath sounds and air entry.   Abdominal:      General: Bowel sounds are normal. There is no distension.      Palpations: Abdomen is soft.      Tenderness: There is no abdominal " tenderness. There is no guarding or rebound.   Musculoskeletal:         General: No deformity or signs of injury.      Cervical back: Neck supple.   Skin:     General: Skin is warm.      Capillary Refill: Capillary refill takes less than 2 seconds.      Coloration: Skin is not cyanotic, jaundiced or pale.      Findings: No rash.   Neurological:      General: No focal deficit present.      Mental Status: He is alert and oriented to person, place, and time. Mental status is at baseline.      Cranial Nerves: No dysarthria.      Motor: No tremor, abnormal muscle tone or seizure activity.      Gait: Gait is intact.   Psychiatric:         Attention and Perception: Attention and perception normal.         Mood and Affect: Mood and affect normal.         Speech: Speech normal.         Behavior: Behavior normal. Behavior is cooperative.         Thought Content: Thought content normal.         Cognition and Memory: Cognition and memory normal.         Judgment: Judgment normal.         Lab Results   Component Value Date    CHLPL 315 (H) 07/13/2022    TRIG 520 (H) 07/13/2022    HDL 36 (L) 07/13/2022     (H) 07/13/2022     Lab Results   Component Value Date    TSH 2.010 07/13/2022     Lab Results   Component Value Date    FREET4 1.11 07/13/2022     Lab Results   Component Value Date    HGBA1C 5.40 07/13/2022    HGBA1C 5.30 06/24/2020     COLONOSCOPY (10/18/2022 09:23)   TISSUE EXAM, [facility]) (10/18/2022 09:49)     Assessment   Diagnoses and all orders for this visit:    1. Annual physical exam (Primary)  -     CBC (No Diff); Future  -     Comprehensive Metabolic Panel; Future  -     Lipid Panel; Future  -     TSH+Free T4; Future  -     Vitamin D,25-Hydroxy; Future  -     PSA Screen; Future  -     Uric Acid; Future  -     Vitamin B12; Future  -     Folate; Future    2. Familial hyperlipidemia  -     Comprehensive Metabolic Panel; Future  -     Lipid Panel; Future  -     TSH+Free T4; Future    3. Elevated  blood pressure reading  Comments:  need to get him under control with DENNIS, referral to sleep medicine to discuss CPAP  Orders:  -     CBC (No Diff); Future  -     Comprehensive Metabolic Panel; Future  -     TSH+Free T4; Future    4. Obstructive sleep apnea on CPAP  -     Ambulatory Referral to Sleep Medicine  -     CBC (No Diff); Future  -     Comprehensive Metabolic Panel; Future    5. Gout, unspecified cause, unspecified chronicity, unspecified site  -     Comprehensive Metabolic Panel; Future  -     Uric Acid; Future    6. Diverticulosis  Comments:  avoid trigger foods, ER for severe pain, office does not call in antibiotics w/o exam  Orders:  -     dicyclomine (BENTYL) 10 MG capsule; Take 1 capsule by mouth 3 (Three) Times a Day As Needed for Abdominal Cramping.  Dispense: 30 capsule; Refill: 1    7. Vasculogenic erectile dysfunction, unspecified vasculogenic erectile dysfunction type  Comments:  trial of viagra, discussed doses, go to ER for priapism 4 hrs  Orders:  -     sildenafil (Viagra) 25 MG tablet; Take 1-2 tablets by mouth Daily As Needed for Erectile Dysfunction.  Dispense: 15 tablet; Refill: 5  -     TSH+Free T4; Future    8. Tobacco use  Comments:  cessation ideal    9. Vitamin D deficiency  -     Vitamin D,25-Hydroxy; Future    10. Prostate cancer screening  -     PSA Screen; Future    11. Medication monitoring encounter  -     CBC (No Diff); Future  -     Comprehensive Metabolic Panel; Future  -     Lipid Panel; Future  -     TSH+Free T4; Future  -     Vitamin D,25-Hydroxy; Future  -     Uric Acid; Future  -     Vitamin B12; Future  -     Folate; Future    12. Abnormal blood chemistry  -     CBC (No Diff); Future  -     Comprehensive Metabolic Panel; Future  -     Lipid Panel; Future  -     TSH+Free T4; Future  -     Vitamin D,25-Hydroxy; Future  -     Uric Acid; Future  -     Vitamin B12; Future  -     Folate; Future         Health maintenance information provided via patient plan (after visit  summary).   Counseled on age appropriate health screenings and immunizations. Declines flu shot today. Father had estelita ellis but patient has had flu shots previously without issue. Will consider and return to clinic if he wants to get.  Encouraged exercise and healthy diet.    BMI is >= 25 and <30. (Overweight) The following options were offered after discussion;: weight loss educational material (shared in after visit summary)      Return in about 6 months (around 8/9/2024) for Blood Pressure follow up.     Heavenly Mcdonough MD

## 2024-02-09 NOTE — PATIENT INSTRUCTIONS
Health Maintenance, Male  A healthy lifestyle and preventive care is important for your health and wellness. Ask your health care provider about what schedule of regular examinations is right for you.  What should I know about weight and diet?    Eat a Healthy Diet  Eat plenty of vegetables, fruits, whole grains, low-fat dairy products, and lean protein.  Do not eat a lot of foods high in solid fats, added sugars, or salt.     Maintain a Healthy Weight  Regular exercise can help you achieve or maintain a healthy weight. You should:  Do at least 150 minutes of exercise each week. The exercise should increase your heart rate and make you sweat (moderate-intensity exercise).  Do strength-training exercises at least twice a week.     Watch Your Levels of Cholesterol and Blood Lipids  Have your blood tested for lipids and cholesterol every 5 years starting at 35 years of age. If you are at high risk for heart disease, you should start having your blood tested when you are 20 years old. You may need to have your cholesterol levels checked more often if:  Your lipid or cholesterol levels are high.  You are older than 50 years of age.  You are at high risk for heart disease.     What should I know about cancer screening?  Many types of cancers can be detected early and may often be prevented.  Lung Cancer  You should be screened every year for lung cancer if:  You are a current smoker who has smoked for at least 30 years.  You are a former smoker who has quit within the past 15 years.  Talk to your health care provider about your screening options, when you should start screening, and how often you should be screened.     Colorectal Cancer  Routine colorectal cancer screening usually begins at 50 years of age and should be repeated every 5-10 years until you are 75 years old. You may need to be screened more often if early forms of precancerous polyps or small growths are found. Your health care provider may recommend  screening at an earlier age if you have risk factors for colon cancer.  Your health care provider may recommend using home test kits to check for hidden blood in the stool.  A small camera at the end of a tube can be used to examine your colon (sigmoidoscopy or colonoscopy). This checks for the earliest forms of colorectal cancer.     Prostate and Testicular Cancer  Depending on your age and overall health, your health care provider may do certain tests to screen for prostate and testicular cancer.  Talk to your health care provider about any symptoms or concerns you have about testicular or prostate cancer.     Skin Cancer  Check your skin from head to toe regularly.  Tell your health care provider about any new moles or changes in moles, especially if:  There is a change in a mole’s size, shape, or color.  You have a mole that is larger than a pencil eraser.  Always use sunscreen. Apply sunscreen liberally and repeat throughout the day.  Protect yourself by wearing long sleeves, pants, a wide-brimmed hat, and sunglasses when outside.     What should I know about heart disease, diabetes, and high blood pressure?  If you are 18-39 years of age, have your blood pressure checked every 3-5 years. If you are 40 years of age or older, have your blood pressure checked every year. You should have your blood pressure measured twice--once when you are at a hospital or clinic, and once when you are not at a hospital or clinic. Record the average of the two measurements. To check your blood pressure when you are not at a hospital or clinic, you can use:  An automated blood pressure machine at a pharmacy.  A home blood pressure monitor.  Talk to your health care provider about your target blood pressure.  If you are between 45-79 years old, ask your health care provider if you should take aspirin to prevent heart disease.  Have regular diabetes screenings by checking your fasting blood sugar level.  If you are at a normal  weight and have a low risk for diabetes, have this test once every three years after the age of 45.  If you are overweight and have a high risk for diabetes, consider being tested at a younger age or more often.  A one-time screening for abdominal aortic aneurysm (AAA) by ultrasound is recommended for men aged 65-75 years who are current or former smokers.  What should I know about preventing infection?  Hepatitis B  If you have a higher risk for hepatitis B, you should be screened for this virus. Talk with your health care provider to find out if you are at risk for hepatitis B infection.  Hepatitis C  Blood testing is recommended for:  Everyone born from 1945 through 1965.  Anyone with known risk factors for hepatitis C.     Sexually Transmitted Diseases (STDs)  You should be screened each year for STDs including gonorrhea and chlamydia if:  You are sexually active and are younger than 24 years of age.  You are older than 24 years of age and your health care provider tells you that you are at risk for this type of infection.  Your sexual activity has changed since you were last screened and you are at an increased risk for chlamydia or gonorrhea. Ask your health care provider if you are at risk.  Talk with your health care provider about whether you are at high risk of being infected with HIV. Your health care provider may recommend a prescription medicine to help prevent HIV infection.     What else can I do?    Schedule regular health, dental, and eye exams.  Stay current with your vaccines (immunizations).  Do not use any tobacco products, such as cigarettes, chewing tobacco, and e-cigarettes. If you need help quitting, ask your health care provider.  Limit alcohol intake to no more than 2 drinks per day. One drink equals 12 ounces of beer, 5 ounces of wine, or 1½ ounces of hard liquor.  Do not use street drugs.  Do not share needles.  Ask your health care provider for help if you need support or information  about quitting drugs.  Tell your health care provider if you often feel depressed.  Tell your health care provider if you have ever been abused or do not feel safe at home.      This information is not intended to replace advice given to you by your health care provider. Make sure you discuss any questions you have with your health care provider.  Document Released: 06/15/2009 Document Revised: 08/16/2017 Document Reviewed: 09/20/2016  [employer] Interactive Patient Education © 2018 [employer]      Steps to Quit Smoking  Smoking tobacco is the leading cause of preventable death. It can affect almost every organ in the body. Smoking puts you and those around you at risk for developing many serious chronic diseases.  Quitting smoking can be very challenging. Do not get discouraged if you are not successful the first time. Some people need to make many attempts to quit before they achieve long-term success. Do your best to stick to your quit plan, and talk with your health care provider if you have any questions or concerns.  How do I get ready to quit?  When you decide to quit smoking, create a plan to help you succeed. Before you quit:  Pick a date to quit. Set a date within the next 2 weeks to give you time to prepare.  Write down the reasons why you are quitting. Keep this list in places where you will see it often.  Tell your family, friends, and co-workers that you are quitting. Support from people you are close to can make quitting easier.  Talk with your health care provider about your options for quitting smoking.  Find out what treatment options are covered by your health insurance.  Identify people, places, things, and activities that make you want to smoke (triggers). Avoid them.  What first steps can I take to quit smoking?  Throw away all cigarettes at home, at work, and in your car.  Throw away smoking accessories, such as ashtrays and lighters.  Clean your car. Make sure to empty the ashtray.  Clean  your home, including curtains and carpets.  What strategies can I use to quit smoking?  Talk with your health care provider about combining strategies, such as taking medicines while you are also receiving in-person counseling. Using these two strategies together makes you more likely to succeed in quitting than if you used either strategy on its own.  If you are pregnant or breastfeeding, talk with your health care provider about finding counseling or other support strategies to quit smoking. Do not take medicine to help you quit smoking unless your health care provider tells you to.  Quit right away  Quit smoking completely, instead of gradually reducing how much you smoke over a period of time. Stopping smoking right away may be more successful than gradually quitting.  Attend in-person counseling to help you build problem-solving skills. You are more likely to succeed in quitting if you attend counseling sessions regularly. Even short sessions of 10 minutes can be effective.  Take medicine  You may take medicines to help you quit smoking. Some medicines require a prescription. You can also purchase over-the-counter medicines. Medicines may have nicotine in them to replace the nicotine in cigarettes. Medicines may:  Help to stop cravings.  Help to relieve withdrawal symptoms.  Your health care provider may recommend:  Nicotine patches, gum, or lozenges.  Nicotine inhalers or sprays.  Non-nicotine medicine that you take by mouth.  Find resources  Find resources and support systems that can help you quit smoking and remain smoke-free after you quit. These resources are most helpful when you use them often. They include:  Online chats with a counselor.  Telephone quitlines.  Printed self-help materials.  Support groups or group counseling.  Text messaging programs.  Mobile phone apps or applications. Use apps that can help you stick to your quit plan by providing reminders, tips, and encouragement. Examples of free  services include Quit Guide from the CDC and smokefree.gov    What can I do to make it easier to quit?    Reach out to your family and friends for support and encouragement. Call telephone quitlines, such as 9-800-QUIT-NOW, reach out to support groups, or work with a counselor for support.  Ask people who smoke to avoid smoking around you.  Avoid places that trigger you to smoke, such as bars, parties, or smoke-break areas at work.  Spend time with people who do not smoke.  Lessen the stress in your life. Stress can be a smoking trigger for some people. To lessen stress, try:  Exercising regularly.  Doing deep-breathing exercises.  Doing yoga.  Meditating.  What benefits will I see if I quit smoking?  Over time, you should start to see positive results, such as:  Improved sense of smell and taste.  Decreased coughing and sore throat.  Slower heart rate.  Lower blood pressure.  Clearer and healthier skin.  The ability to breathe more easily.  Fewer sick days.  Summary  Quitting smoking can be very challenging. Do not get discouraged if you are not successful the first time. Some people need to make many attempts to quit before they achieve long-term success.  When you decide to quit smoking, create a plan to help you succeed.  Quit smoking right away, not slowly over a period of time.  Find resources and support systems that can help you quit smoking and remain smoke-free after you quit.  This information is not intended to replace advice given to you by your health care provider. Make sure you discuss any questions you have with your health care provider.  Document Revised: 12/09/2022 Document Reviewed: 12/09/2022  [employer]vier Patient Education © 2023 [employer]

## 2024-02-15 ENCOUNTER — TELEPHONE (OUTPATIENT)
Dept: INTERNAL MEDICINE | Facility: CLINIC | Age: 54
End: 2024-02-15
Payer: OTHER GOVERNMENT

## 2024-02-22 NOTE — TELEPHONE ENCOUNTER
"Relay     \"Cholesterol remains very high. Adding cholesterol med such as Crestor (a statin) is ideal to lower risk of stroke. If patient agreeable let me know, will send rx.     Vitamin D low. Suggest taking vitamin D3 over the counter 2000 IU daily and trying to get 15 min of sun exposure daily to face and arms (when possible).     Uric acid, which is associated with gout when elevated, remains high but a bit lower than last check. If patient wants to lower risk of gout can add a daily medicine such as allopurinol which lowers uric acid levels. Would have to start med at a point when no signs/symptoms of gout (as starting during a gout flare can worsen pain). Again, let me know if he is interested.     Remainder of labs okay. \"        "
----- Message from Heavenly Mcdonough MD sent at 2/15/2024  1:49 PM EST -----  Cholesterol remains very high. Adding cholesterol med such as Crestor (a statin) is ideal to lower risk of stroke. If patient agreeable let me know, will send rx.     Vitamin D low. Suggest taking vitamin D3 over the counter 2000 IU daily and trying to get 15 min of sun exposure daily to face and arms (when possible).     Uric acid, which is associated with gout when elevated, remains high but a bit lower than last check. If patient wants to lower risk of gout can add a daily medicine such as allopurinol which lowers uric acid levels. Would have to start med at a point when no signs/symptoms of gout (as starting during a gout flare can worsen pain). Again, let me know if he is interested.     Remainder of labs okay.  
Left  for return call.   
Mailed letter to patient.  
ambulatory

## 2025-04-30 ENCOUNTER — PATIENT MESSAGE (OUTPATIENT)
Dept: INTERNAL MEDICINE | Facility: CLINIC | Age: 55
End: 2025-04-30
Payer: OTHER GOVERNMENT

## 2025-06-23 ENCOUNTER — TELEPHONE (OUTPATIENT)
Dept: INTERNAL MEDICINE | Facility: CLINIC | Age: 55
End: 2025-06-23

## 2025-06-23 ENCOUNTER — PATIENT MESSAGE (OUTPATIENT)
Dept: INTERNAL MEDICINE | Facility: CLINIC | Age: 55
End: 2025-06-23

## 2025-06-23 ENCOUNTER — OFFICE VISIT (OUTPATIENT)
Dept: INTERNAL MEDICINE | Facility: CLINIC | Age: 55
End: 2025-06-23
Payer: OTHER GOVERNMENT

## 2025-06-23 VITALS
OXYGEN SATURATION: 96 % | HEIGHT: 73 IN | DIASTOLIC BLOOD PRESSURE: 80 MMHG | WEIGHT: 217.8 LBS | SYSTOLIC BLOOD PRESSURE: 140 MMHG | TEMPERATURE: 98.8 F | BODY MASS INDEX: 28.86 KG/M2 | HEART RATE: 84 BPM

## 2025-06-23 DIAGNOSIS — Z12.11 COLON CANCER SCREENING: ICD-10-CM

## 2025-06-23 DIAGNOSIS — Z28.21 PNEUMOCOCCAL VACCINATION DECLINED: ICD-10-CM

## 2025-06-23 DIAGNOSIS — Z00.00 ANNUAL PHYSICAL EXAM: Primary | ICD-10-CM

## 2025-06-23 DIAGNOSIS — M1A.00X0 IDIOPATHIC CHRONIC GOUT WITHOUT TOPHUS, UNSPECIFIED SITE: ICD-10-CM

## 2025-06-23 DIAGNOSIS — E78.49 FAMILIAL HYPERLIPIDEMIA: ICD-10-CM

## 2025-06-23 DIAGNOSIS — Z80.0 FAMILY HISTORY OF COLON CANCER IN MOTHER: ICD-10-CM

## 2025-06-23 PROBLEM — R03.0 ELEVATED BLOOD PRESSURE READING: Status: ACTIVE | Noted: 2025-06-23

## 2025-06-23 PROBLEM — H92.02 LEFT EAR PAIN: Status: RESOLVED | Noted: 2023-06-19 | Resolved: 2025-06-23

## 2025-06-23 PROCEDURE — 99396 PREV VISIT EST AGE 40-64: CPT | Performed by: FAMILY MEDICINE

## 2025-06-23 RX ORDER — COLCHICINE 0.6 MG/1
TABLET ORAL
Qty: 11 TABLET | Refills: 1 | Status: SHIPPED | OUTPATIENT
Start: 2025-06-23

## 2025-06-23 RX ORDER — INDOMETHACIN 50 MG/1
50 CAPSULE ORAL 3 TIMES DAILY PRN
Qty: 15 CAPSULE | Refills: 2 | Status: SHIPPED | OUTPATIENT
Start: 2025-06-23

## 2025-06-23 NOTE — PATIENT INSTRUCTIONS
Health Maintenance, Male  Adopting a healthy lifestyle and getting preventive care are important in promoting health and wellness. Ask your health care provider about:  The right schedule for you to have regular tests and exams.  Things you can do on your own to prevent diseases and keep yourself healthy.    What should I know about diet, weight, and exercise?  Eat a healthy diet    Eat a diet that includes plenty of vegetables, fruits, low-fat dairy products, and lean protein.  Do not eat a lot of foods that are high in solid fats, added sugars, or sodium.    Maintain a healthy weight  Body mass index (BMI) is a measurement that can be used to identify possible weight problems. It estimates body fat based on height and weight. Your health care provider can help determine your BMI and help you achieve or maintain a healthy weight.    Get regular exercise  Get regular exercise. This is one of the most important things you can do for your health. Most adults should:  Exercise for at least 150 minutes each week. The exercise should increase your heart rate and make you sweat (moderate-intensity exercise).  Do strengthening exercises at least twice a week. This is in addition to the moderate-intensity exercise.  Spend less time sitting. Even light physical activity can be beneficial.    Watch cholesterol and blood lipids  Have your blood tested for lipids and cholesterol at 20 years of age, then have this test every 5 years.  You may need to have your cholesterol levels checked more often if:  Your lipid or cholesterol levels are high.  You are older than 40 years of age.  You are at high risk for heart disease.    What should I know about cancer screening?  Many types of cancers can be detected early and may often be prevented. Depending on your health history and family history, you may need to have cancer screening at various ages. This may include screening for:  Colorectal cancer.  Prostate cancer.  Skin  cancer.  Lung cancer.    What should I know about heart disease, diabetes, and high blood pressure?  Blood pressure and heart disease  High blood pressure causes heart disease and increases the risk of stroke. This is more likely to develop in people who have high blood pressure readings or are overweight.  Talk with your health care provider about your target blood pressure readings.  Have your blood pressure checked:  Every 3-5 years if you are 18-39 years of age.  Every year if you are 40 years old or older.  If you are between the ages of 65 and 75 and are a current or former smoker, ask your health care provider if you should have a one-time screening for abdominal aortic aneurysm (AAA).    Diabetes  Have regular diabetes screenings. This checks your fasting blood sugar level. Have the screening done:  Once every three years after age 45 if you are at a normal weight and have a low risk for diabetes.  More often and at a younger age if you are overweight or have a high risk for diabetes.    What should I know about preventing infection?  Hepatitis B  If you have a higher risk for hepatitis B, you should be screened for this virus. Talk with your health care provider to find out if you are at risk for hepatitis B infection.  Hepatitis C  Blood testing is recommended for:  All adults aged 18 to 79 years  Anyone with known risk factors for hepatitis C.  Sexually transmitted infections (STIs)  You should be screened each year for STIs, including gonorrhea and chlamydia, if:  You are sexually active and are younger than 24 years of age.  You are older than 24 years of age and your health care provider tells you that you are at risk for this type of infection.  Your sexual activity has changed since you were last screened, and you are at increased risk for chlamydia or gonorrhea. Ask your health care provider if you are at risk.  Ask your health care provider about whether you are at high risk for HIV. Your health  care provider may recommend a prescription medicine to help prevent HIV infection. If you choose to take medicine to prevent HIV, you should first get tested for HIV. You should then be tested every 3 months for as long as you are taking the medicine.    Follow these instructions at home:  Alcohol use  Do not drink alcohol if your health care provider tells you not to drink.  If you drink alcohol:  Limit how much you have to 0-2 drinks a day.  Know how much alcohol is in your drink. In the [place] one drink equals one 12 oz bottle of beer (355 mL), one 5 oz glass of wine (148 mL), or one 1½ oz glass of hard liquor (44 mL).  Lifestyle  Do not use any products that contain nicotine or tobacco. These products include cigarettes, chewing tobacco, and vaping devices, such as e-cigarettes. If you need help quitting, ask your health care provider.  Do not use street drugs.  Do not share needles.  Ask your health care provider for help if you need support or information about quitting drugs.    General instructions  Schedule regular health, dental, and eye exams.  Stay current with your vaccines.  Tell your health care provider if:  You often feel depressed.  You have ever been abused or do not feel safe at home.    Summary  Adopting a healthy lifestyle and getting preventive care are important in promoting health and wellness.  Follow your health care provider's instructions about healthy diet, exercising, and getting tested or screened for diseases.  Follow your health care provider's instructions on monitoring your cholesterol and blood pressure.    This information is not intended to replace advice given to you by your health care provider. Make sure you discuss any questions you have with your health care provider.  Document Revised: 05/09/2022 Document Reviewed: 05/09/2022  [employer] Patient Education © 2023 [employer]  Updated 2/29/24 tc

## 2025-06-23 NOTE — ASSESSMENT & PLAN NOTE
Monitor home blood pressure. Goal <130/80. If remains elevated may benefit from losartan which can lower uric acid levels.

## 2025-06-23 NOTE — PROGRESS NOTES
06/23/2025  Chief Complaint   Patient presents with    Annual Exam     Discuss [facility]oscopy, [facility].     Foot Pain     Left, has had steroids and colchicine per Urgent [facility] on [facility].        Patient Care Team:  Heavenly Mcdonough MD as PCP - General (Family Medicine)]       Nicho Molina is here for his annual preventive exam. History per MA reviewed.     [facility]  They suggested he start allopurinol hasn't received yet           Health Maintenance   Topic Date Due    ANNUAL PHYSICAL  02/09/2025    LIPID PANEL  02/14/2025    COLORECTAL CANCER SCREENING  10/18/2025    COVID-19 Vaccine (3 - 2024-25 season) 07/07/2025 (Originally 9/1/2024)    Pneumococcal Vaccine 50+ (1 of 1 - PCV) 12/20/2025 (Originally 10/15/2018)    INFLUENZA VACCINE  07/01/2025    TDAP/TD VACCINES (5 - Td or Tdap) 06/22/2030    Orthopox/Mpox  Completed    ZOSTER VACCINE  Completed    HEPATITIS C SCREENING  Discontinued       Immunization History   Administered Date(s) Administered    Anthrax 03/04/2008, 03/20/2008, 04/21/2008, 11/01/2008    COVID-19 (MODERNA) 1st,2nd,3rd Dose Monovalent 03/01/2021, 04/12/2021    Hepatitis A 02/19/1998, 09/12/1998    Hepatitis B Adult/Adolescent IM 02/14/1998, 03/14/1998, 09/12/1998    Influenza Injectable Mdck Pf Quad 10/01/2020    Influenza, Unspecified 10/08/2019, 10/01/2020    MMR 08/05/2002    OPV 09/09/1992, 04/02/2004    PPD Test 06/23/2003, 04/02/2004, 03/04/2008    Shingrix 07/13/2020, 10/13/2020    Smallpox 03/04/2008    Td (TDVAX) 03/10/1990, 03/05/1997, 04/02/2004    Tdap 06/22/2020    Typhoid, Unspecified 03/10/1990, 02/14/1998, 04/02/2004, 03/04/2008    Varicella Zoster Immune Globulin 04/02/2004    Yellow Fever 03/14/1998    flucelvax quad pfs =>4 YRS 10/08/2019         The following portions of the patient's history were reviewed and updated as appropriate: allergies, current medications, past family history, past medical history, past  "social history, past surgical history and problem list.    Objective   Visit Vitals  /80   Pulse 84   Temp 98.8 °F (37.1 °C)   Ht 185.4 cm (73\")   Wt 98.8 kg (217 lb 12.8 oz)   SpO2 96%   BMI 28.74 kg/m²              Physical Exam  Vitals and nursing note reviewed.   Constitutional:       General: He is not in acute distress.     Appearance: Normal appearance. He is well-developed and well-groomed. obeseHe is not ill-appearing, toxic-appearing or diaphoretic.   HENT:      Head: Normocephalic and atraumatic.      Right Ear: Hearing, tympanic membrane, ear canal and external ear normal.      Left Ear: Hearing, tympanic membrane, ear canal and external ear normal.      Nose: Nose normal.      Mouth/Throat:      Mouth: Mucous membranes are moist.   Eyes:      General: Lids are normal. Gaze aligned appropriately. No scleral icterus.        Right eye: No discharge.         Left eye: No discharge.      Extraocular Movements: Extraocular movements intact.      Conjunctiva/sclera: Conjunctivae normal.      Pupils: Pupils are equal, round, and reactive to light.   Neck:      Thyroid: No thyromegaly.      Trachea: Phonation normal.   Cardiovascular:      Rate and Rhythm: Normal rate and regular rhythm.      Heart sounds: Normal heart sounds.   Pulmonary:      Effort: Pulmonary effort is normal.      Breath sounds: Normal breath sounds and air entry.   Abdominal:      General: Bowel sounds are normal. There is no distension.      Palpations: Abdomen is soft.      Tenderness: There is no abdominal tenderness. There is no guarding or rebound.   Musculoskeletal:         General: No deformity or signs of injury.      Cervical back: Neck supple.   Skin:     General: Skin is warm.      Capillary Refill: Capillary refill takes less than 2 seconds.      Coloration: Skin is not cyanotic, jaundiced or pale.      Findings: No rash.   Neurological:      General: No focal deficit present.      Mental Status: He is alert and oriented " to person, place, and time. Mental status is at baseline.      Cranial Nerves: No dysarthria.      Motor: No tremor, abnormal muscle tone or seizure activity.      Gait: Gait is intact.   Psychiatric:         Attention and Perception: Attention and perception normal.         Mood and Affect: Mood and affect normal.         Speech: Speech normal.         Behavior: Behavior normal. Behavior is cooperative.         Thought Content: Thought content normal.         Cognition and Memory: Cognition and memory normal.         Judgment: Judgment normal.         Lab Results   Component Value Date    CHLPL 277 (H) 02/14/2024    TRIG 311 (H) 02/14/2024    HDL 40 02/14/2024     (H) 02/14/2024     Lab Results   Component Value Date    TSH 1.860 02/14/2024     Lab Results   Component Value Date    FREET4 1.12 02/14/2024     Lab Results   Component Value Date    HGBA1C 5.40 07/13/2022    HGBA1C 5.30 06/24/2020     Lab Results   Component Value Date    PSA 0.868 02/14/2024    PSA 0.900 07/13/2022    PSA 0.902 06/22/2020        Assessment   Diagnoses and all orders for this visit:    1. Annual physical exam (Primary)    2. Idiopathic chronic gout without tophus, unspecified site  Assessment & Plan:  If he [facility] of [facility] would like to have a copy for chart  Discussed [facility]opurinol the [facility] has prescribed. Not to start until gout flare completely resolved  Blood pressure borderline, could consider losartan as alternative therapy    Orders:  -     colchicine 0.6 MG tablet; TAKE 1.2 MG PO AT FIRST SIGN OF FLARE, THEN 0.6 MG ONE HOUR LATER. DAYS 2-5 TAKE 0.6 MG BID PRN ONGOING SYMPTOMS  Dispense: 11 tablet; Refill: 1  -     indomethacin (INDOCIN) 50 MG capsule; Take 1 capsule by mouth 3 (Three) Times a Day As Needed for Moderate Pain. Take no more than 5 days in a row.  Dispense: 15 capsule; Refill: 2    3. Familial hyperlipidemia  Assessment & Plan:  Per patient [facility]. If we need to order lipid profile would also  order lipoprotein (a)      4. Colon cancer screening  -     Ambulatory Referral For Screening Colonoscopy    5. Family history of colon cancer in mother  -     Ambulatory Referral For Screening Colonoscopy    6. Pneumococcal vaccination declined         Health maintenance information provided via patient plan (after visit summary).   Counseled on age appropriate health screenings and immunizations.   Encouraged exercise and healthy diet.    BMI is >= 25 and <30. (Overweight) The following options were offered after discussion;: weight loss educational material (shared in after visit summary)      Return in about 1 year (around 6/24/2026) for Annual physical, sooner if needed.     Heavenly Mcdonough MD

## 2025-06-23 NOTE — ASSESSMENT & PLAN NOTE
If he receives [facility] of labs from [facility] would like to have a copy for chart  Discussed allopurinol the [facility] has prescribed. Not to start until gout flare completely resolved  Blood pressure borderline, could consider losartan as alternative therapy

## 2025-06-23 NOTE — TELEPHONE ENCOUNTER
Patient called and gave a BP reading of 148/100. I told the patient to increase water intake and limit the sodium in his diet. Recheck in 1 hour and if BP hasn't gone down then to head to the ED to be evaluated. Patient voiced understanding and agreed to plan

## 2025-06-24 ENCOUNTER — TELEPHONE (OUTPATIENT)
Dept: SURGERY | Facility: CLINIC | Age: 55
End: 2025-06-24

## 2025-06-24 NOTE — TELEPHONE ENCOUNTER
Caller: LOGAN    Relationship: [facility]    Best call back number: [phone]  EXT [id number]    What form or medical record are you requesting: [facility]SCOPE PROCEDURE REPORT FROM 10-18-22    Who is requesting this form or medical record from you: [facility]    How would you like to receive the form or medical records (pick-up, mail, fax): FAX  If fax, what is the fax number:[phone]      Timeframe paperwork needed: ASAP    Additional notes: LOGAN CALLED FROM THE [facility] REQUESTING [facility]SCOPE PROCEDURE REPORT FROM 10-18-22

## 2025-06-25 ENCOUNTER — TELEPHONE (OUTPATIENT)
Dept: SURGERY | Facility: CLINIC | Age: 55
End: 2025-06-25
Payer: OTHER GOVERNMENT

## 2025-06-25 NOTE — TELEPHONE ENCOUNTER
Left VM for the pt regarding a colonoscopy referral we received from Dr. Mcdonough--Will try again to reach the pt to go over the colonoscopy screening questions. 1st attempt.     *IF PT RETURNS THIS CALL, PLEASE SEND THE CALL TO THE OFFICE. ASK FOR MAKAYLA

## 2025-06-27 NOTE — TELEPHONE ENCOUNTER
Left VM for the pt regarding this referral--Will try again to reach the pt to go over the colonoscopy screening questions. 2nd attempt.      *IF PT RETURNS THIS CALL, PLEASE SEND THE CALL TO THE OFFICE. ASK FOR MAKAYLA

## 2025-07-01 DIAGNOSIS — Z12.11 COLON CANCER SCREENING: Primary | ICD-10-CM

## 2025-07-01 RX ORDER — BISACODYL 5 MG
TABLET, DELAYED RELEASE (ENTERIC COATED) ORAL
Qty: 4 TABLET | Refills: 0 | Status: SHIPPED | OUTPATIENT
Start: 2025-07-01

## 2025-07-01 RX ORDER — POLYETHYLENE GLYCOL 3350 17 G/17G
238 POWDER, FOR SOLUTION ORAL DAILY
Qty: 238 G | Refills: 0 | Status: SHIPPED | OUTPATIENT
Start: 2025-07-01 | End: 2025-07-02

## 2025-08-25 ENCOUNTER — TELEPHONE (OUTPATIENT)
Dept: SURGERY | Facility: CLINIC | Age: 55
End: 2025-08-25
Payer: OTHER GOVERNMENT

## (undated) DEVICE — VLV SXN AIR/H2O ORCAPOD3 1P/U STRL

## (undated) DEVICE — GLV SURG SENSICARE W/ALOE PF LF SZ6 STRL

## (undated) DEVICE — LUBE JELLY PK/2.75GM STRL BX/144

## (undated) DEVICE — FRCP BX RADJAW4 NDL 2.8 240 STD OG

## (undated) DEVICE — MEDI-VAC NON-CONDUCTIVE SUCTION TUBING: Brand: CARDINAL HEALTH

## (undated) DEVICE — Device